# Patient Record
Sex: FEMALE | Race: WHITE | NOT HISPANIC OR LATINO | Employment: FULL TIME | URBAN - METROPOLITAN AREA
[De-identification: names, ages, dates, MRNs, and addresses within clinical notes are randomized per-mention and may not be internally consistent; named-entity substitution may affect disease eponyms.]

---

## 2017-01-09 PROBLEM — I10 BENIGN ESSENTIAL HYPERTENSION: Status: ACTIVE | Noted: 2017-01-09

## 2017-01-15 ENCOUNTER — OFFICE VISIT (OUTPATIENT)
Dept: URGENT CARE | Facility: URGENT CARE | Age: 37
End: 2017-01-15
Payer: COMMERCIAL

## 2017-01-15 VITALS
OXYGEN SATURATION: 98 % | HEART RATE: 98 BPM | SYSTOLIC BLOOD PRESSURE: 142 MMHG | DIASTOLIC BLOOD PRESSURE: 84 MMHG | TEMPERATURE: 96.4 F

## 2017-01-15 DIAGNOSIS — H66.002 ACUTE SUPPURATIVE OTITIS MEDIA OF LEFT EAR WITHOUT SPONTANEOUS RUPTURE OF TYMPANIC MEMBRANE, RECURRENCE NOT SPECIFIED: Primary | ICD-10-CM

## 2017-01-15 DIAGNOSIS — B37.31 YEAST INFECTION OF THE VAGINA: ICD-10-CM

## 2017-01-15 PROCEDURE — 99213 OFFICE O/P EST LOW 20 MIN: CPT | Performed by: NURSE PRACTITIONER

## 2017-01-15 RX ORDER — FLUCONAZOLE 150 MG/1
150 TABLET ORAL
Qty: 2 TABLET | Refills: 0 | Status: SHIPPED | OUTPATIENT
Start: 2017-01-15 | End: 2017-03-13

## 2017-01-15 NOTE — PROGRESS NOTES
SUBJECTIVE:  Yolanda Barnes is a 36 year old female who presents with left ear fullness for 2 day(s).   Severity: moderate   Additional symptoms include congestion, cough, ear pain and post-nasal drip.      History of recurrent otitis: Finished antibiotic last week for left ear infection     Past Medical History   Diagnosis Date     Major depressive disorder, recurrent episode, moderate (H)      major depressive affective disorder, recurrent episodesEvaristo, Dr. MER Damon, psychiatrist.      Dysplasia of cervix, unspecified age 17     [facility]     Human papillomavirus in conditions classified elsewhere and of unspecified site      age 18     Irritable bowel syndrome 1/05     Anxiety      Chickenpox      ASCUS favor benign 8/2014     Neg HPV - cotest in 3 yrs       Social History   Substance Use Topics     Smoking status: Never Smoker      Smokeless tobacco: Never Used     Alcohol Use: No      Comment: rare- quit with pregnancy       REVIEW OF SYSTEMS  ROS:   CONSTITUTIONAL:NEGATIVE for fever, chills, change in weight  INTEGUMENTARY/SKIN: NEGATIVE for worrisome rashes, moles or lesions  EYES: NEGATIVE for vision changes or irritation  ENT/MOUTH: See above   RESP:NEGATIVE for significant cough or SOB  CV: NEGATIVE for chest pain, palpitations or peripheral edema  MUSCULOSKELETAL: NEGATIVE for significant arthralgias or myalgia        OBJECTIVE:  /84 mmHg  Pulse 98  Temp(Src) 96.4  F (35.8  C) (Tympanic)  SpO2 98%   The right TM is normal: no effusions, no erythema, and normal landmarks     The right auditory canal is normal and without drainage, edema or erythema  The left TM is erythematous  The left auditory canal is normal and without drainage, edema or erythema  Oropharynx exam is normal: no lesions, erythema, adenopathy or exudate.  GENERAL: no acute distress  EYES: EOMI,  PERRL, conjunctiva clear  NECK: supple, non-tender to palpation, no adenopathy noted  RESP:  lungs clear to auscultation - no rales, rhonchi or wheezes  SKIN: no suspicious lesions or rashes   CV: regular rates and rhythm, normal    ASSESSMENT:    ICD-10-CM    1. Acute suppurative otitis media of left ear without spontaneous rupture of tympanic membrane, recurrence not specified H66.002 amoxicillin-clavulanate (AUGMENTIN) 875-125 MG per tablet   2. Yeast infection of the vagina B37.3 fluconazole (DIFLUCAN) 150 MG tablet         PLAN:  Has a history of getting yeast infection will prescribe fluconazole.    Patient Instructions   Use medication as directed.    May use acetaminophen, ibuprofen prn.  Follow up with PCP for recheck in 2 weeks, return sooner if symptoms worsen or change.    Discussed further evaluation by ENT if recurrent otitis media or treatment failure occurs.     Patient voiced understanding of instructions given.       Middle Ear Infection (Adult)  You have an infection of the middle ear (the space behind the eardrum). This is also called acute otitis media (AOM). Sometimes it is caused by the common cold. This is because congestion can block the internal passage (eustachian tube) that drains fluid from the middle ear. When the middle ear fills with fluid, bacteria can grow there and cause an infection. Oral antibiotics are used to treat this illness, not ear drops. Symptoms usually start to improve within 1 to 2 days of treatment.    Home care  The following are general care guidelines:    Finish all of the antibiotic medicine given, even though you may feel better after the first few days.    You may use acetaminophen or ibuprofen to control pain, unless something else was prescribed. [NOTE: If you have chronic liver or kidney disease or have ever had a stomach ulcer or GI bleeding, talk with your doctor before using these medicines.] Do not give aspirin to anyone under 18 years of age who has a fever. It may cause severe liver damage.  Follow-up care  Follow up with your doctor in 2 weeks  if all symptoms have not gotten better, or if hearing doesn't go back to normal within 1 month.  When to seek medical care  Get prompt medical attention if any of the following occur:    Ear pain gets worse or does not improve after 3 days of treatment    Unusual drowsiness or confusion    Neck pain, stiff neck, or headache    Fluid or blood draining from the ear canal    Fever of 100.4 F (38 C) or higher after 3 days of antibiotics, or as directed by your health care provider    Convulsion (seizure)    4090-9126 [employer]. [address] [place] [address]. All rights reserved. This information is not intended as a substitute for professional medical care. Always follow your healthcare professional's instructions.        Acute Sinusitis    Acute sinusitis is inflammation (irritation and swelling) of the sinuses. It is usually from a bacterial infection that follows an upper respiratory viral infection. Your doctor can help you find relief. Read on to learn more.  What is acute sinusitis?  Sinuses are air-filled spaces in the skull behind the face. They are kept moist and clean by a lining of mucosa. Things such as pollen, smoke, and chemical fumes can irritate the mucosa. It can then become inflamed (swell up). As a response to irritation, the mucosa makes more mucus and other fluids. Tiny hairlike cilia cover the mucosa. Cilia help transport mucus toward the opening of the sinus. Too much mucus may cause the cilia to stop working. This blocks the sinus opening. A buildup of fluid in the sinuses then leads to symptoms such as pain and pressure. It can also encourage growth of bacteria in the sinuses.  Common symptoms of acute sinusitis  You may have:    Facial soreness pain    Headache    Fever    Postnasal drip (drainage in the back of the throat)    Congestion    Drainage that is thick and colored, instead of clear    Cough  Diagnosis of acute sinusitis  The doctor will ask about your  symptoms and medical history. He or she will examine your ear, nose, and throat. X-rays are usually not needed. If your sinusitis recurs, you may have a culture to check for bacteria or imaging tests.     An evaluation will be done. A culture (sample of mucus) is sometimes taken to check for bacteria. If you have multiple bouts of sinusitis, imaging (X-rays or CAT scans) may be done to check for an anatomic cause of the infection.  Treatment of acute sinusitis  Treatment is designed to unblock the sinus opening and help the cilia work again. Antihistamine and decongestant medications may be prescribed. These can reduce inflammation and decrease fluid production. If a bacterial infection is present, it is treated with antibiotic medication for 10 to 14 days. This medication should be taken until it is gone, even if you feel better.    [id number] [employer]. [address] [place] [address]. All rights reserved. This information is not intended as a substitute for professional medical care. Always follow your healthcare professional's instructions.          TANNA Tanner CNP

## 2017-01-15 NOTE — PATIENT INSTRUCTIONS
Use medication as directed.    May use acetaminophen, ibuprofen prn.  Follow up with PCP for recheck in 2 weeks, return sooner if symptoms worsen or change.    Discussed further evaluation by ENT if recurrent otitis media or treatment failure occurs.     Patient voiced understanding of instructions given.       Middle Ear Infection (Adult)  You have an infection of the middle ear (the space behind the eardrum). This is also called acute otitis media (AOM). Sometimes it is caused by the common cold. This is because congestion can block the internal passage (eustachian tube) that drains fluid from the middle ear. When the middle ear fills with fluid, bacteria can grow there and cause an infection. Oral antibiotics are used to treat this illness, not ear drops. Symptoms usually start to improve within 1 to 2 days of treatment.    Home care  The following are general care guidelines:    Finish all of the antibiotic medicine given, even though you may feel better after the first few days.    You may use acetaminophen or ibuprofen to control pain, unless something else was prescribed. [NOTE: If you have chronic liver or kidney disease or have ever had a stomach ulcer or GI bleeding, talk with your doctor before using these medicines.] Do not give aspirin to anyone under 18 years of age who has a fever. It may cause severe liver damage.  Follow-up care  Follow up with your doctor in 2 weeks if all symptoms have not gotten better, or if hearing doesn't go back to normal within 1 month.  When to seek medical care  Get prompt medical attention if any of the following occur:    Ear pain gets worse or does not improve after 3 days of treatment    Unusual drowsiness or confusion    Neck pain, stiff neck, or headache    Fluid or blood draining from the ear canal    Fever of 100.4 F (38 C) or higher after 3 days of antibiotics, or as directed by your health care provider    Convulsion (seizure)    8168-3002 [employer] [address] [place] [address]. All rights reserved. This information is not intended as a substitute for professional medical care. Always follow your healthcare professional's instructions.        Acute Sinusitis    Acute sinusitis is inflammation (irritation and swelling) of the sinuses. It is usually from a bacterial infection that follows an upper respiratory viral infection. Your doctor can help you find relief. Read on to learn more.  What is acute sinusitis?  Sinuses are air-filled spaces in the skull behind the face. They are kept moist and clean by a lining of mucosa. Things such as pollen, smoke, and chemical fumes can irritate the mucosa. It can then become inflamed (swell up). As a response to irritation, the mucosa makes more mucus and other fluids. Tiny hairlike cilia cover the mucosa. Cilia help transport mucus toward the opening of the sinus. Too much mucus may cause the cilia to stop working. This blocks the sinus opening. A buildup of fluid in the sinuses then leads to symptoms such as pain and pressure. It can also encourage growth of bacteria in the sinuses.  Common symptoms of acute sinusitis  You may have:    Facial soreness pain    Headache    Fever    Postnasal drip (drainage in the back of the throat)    Congestion    Drainage that is thick and colored, instead of clear    Cough  Diagnosis of acute sinusitis  The doctor will ask about your symptoms and medical history. He or she will examine your ear, nose, and throat. X-rays are usually not needed. If your sinusitis recurs, you may have a culture to check for bacteria or imaging tests.     An evaluation will be done. A culture (sample of mucus) is sometimes taken to check for bacteria. If you have multiple bouts of sinusitis, imaging (X-rays or CAT scans) may be done to check for an anatomic cause of the infection.  Treatment of acute sinusitis  Treatment is designed to unblock the sinus opening and help the cilia work  again. Antihistamine and decongestant medications may be prescribed. These can reduce inflammation and decrease fluid production. If a bacterial infection is present, it is treated with antibiotic medication for 10 to 14 days. This medication should be taken until it is gone, even if you feel better.    4314-3773 [employer] [address] [place] [address], [place] [address]. All rights reserved. This information is not intended as a substitute for professional medical care. Always follow your healthcare professional's instructions.

## 2017-01-15 NOTE — MR AVS SNAPSHOT
After Visit Summary   1/15/2017    Yolanda Barnes    MRN: [id number]           Patient Information     Date Of Birth          1980        Visit Information        Provider Department      1/15/2017 9:05 AM Frances Mccarty APRN CNP [facility]        Today's Diagnoses     Acute suppurative otitis media of left ear without spontaneous rupture of tympanic membrane, recurrence not specified    -  1       Care Instructions    Use medication as directed.    May use acetaminophen, ibuprofen prn.  Follow up with PCP for recheck in 2 weeks, return sooner if symptoms worsen or change.    Discussed further evaluation by ENT if recurrent otitis media or treatment failure occurs.     Patient voiced understanding of instructions given.       Middle Ear Infection (Adult)  You have an infection of the middle ear (the space behind the eardrum). This is also called acute otitis media (AOM). Sometimes it is caused by the common cold. This is because congestion can block the internal passage (eustachian tube) that drains fluid from the middle ear. When the middle ear fills with fluid, bacteria can grow there and cause an infection. Oral antibiotics are used to treat this illness, not ear drops. Symptoms usually start to improve within 1 to 2 days of treatment.    Home care  The following are general care guidelines:    Finish all of the antibiotic medicine given, even though you may feel better after the first few days.    You may use acetaminophen or ibuprofen to control pain, unless something else was prescribed. [NOTE: If you have chronic liver or kidney disease or have ever had a stomach ulcer or GI bleeding, talk with your doctor before using these medicines.] Do not give aspirin to anyone under 18 years of age who has a fever. It may cause severe liver damage.  Follow-up care  Follow up with your doctor in 2 weeks if all symptoms have not gotten better, or if hearing  doesn't go back to normal within 1 month.  When to seek medical care  Get prompt medical attention if any of the following occur:    Ear pain gets worse or does not improve after 3 days of treatment    Unusual drowsiness or confusion    Neck pain, stiff neck, or headache    Fluid or blood draining from the ear canal    Fever of 100.4 F (38 C) or higher after 3 days of antibiotics, or as directed by your health care provider    Convulsion (seizure)    1126-0389 [employer]. [address] [place] [address]. All rights reserved. This information is not intended as a substitute for professional medical care. Always follow your healthcare professional's instructions.        Acute Sinusitis    Acute sinusitis is inflammation (irritation and swelling) of the sinuses. It is usually from a bacterial infection that follows an upper respiratory viral infection. Your doctor can help you find relief. Read on to learn more.  What is acute sinusitis?  Sinuses are air-filled spaces in the skull behind the face. They are kept moist and clean by a lining of mucosa. Things such as pollen, smoke, and chemical fumes can irritate the mucosa. It can then become inflamed (swell up). As a response to irritation, the mucosa makes more mucus and other fluids. Tiny hairlike cilia cover the mucosa. Cilia help transport mucus toward the opening of the sinus. Too much mucus may cause the cilia to stop working. This blocks the sinus opening. A buildup of fluid in the sinuses then leads to symptoms such as pain and pressure. It can also encourage growth of bacteria in the sinuses.  Common symptoms of acute sinusitis  You may have:    Facial soreness pain    Headache    Fever    Postnasal drip (drainage in the back of the throat)    Congestion    Drainage that is thick and colored, instead of clear    Cough  Diagnosis of acute sinusitis  The doctor will ask about your symptoms and medical history. He or she will examine your  ear, nose, and throat. X-rays are usually not needed. If your sinusitis recurs, you may have a culture to check for bacteria or imaging tests.     An evaluation will be done. A culture (sample of mucus) is sometimes taken to check for bacteria. If you have multiple bouts of sinusitis, imaging (X-rays or CAT scans) may be done to check for an anatomic cause of the infection.  Treatment of acute sinusitis  Treatment is designed to unblock the sinus opening and help the cilia work again. Antihistamine and decongestant medications may be prescribed. These can reduce inflammation and decrease fluid production. If a bacterial infection is present, it is treated with antibiotic medication for 10 to 14 days. This medication should be taken until it is gone, even if you feel better.    0626-6026 [employer]. [address] [place] [address]. All rights reserved. This information is not intended as a substitute for professional medical care. Always follow your healthcare professional's instructions.              Follow-ups after your visit        Your next 10 appointments already scheduled     Jan 27, 2017 10:00 AM   SHORT with Gasper Araya MD   [facility] ([address] [id number] [phone]              Who to contact     If you have questions or need follow up information about today's clinic visit or your schedule please contact [facility] directly at [phone].  Normal or non-critical lab and imaging results will be communicated to you by MyChart, letter or phone within 4 business days after the clinic has received the results. If you do not hear from us within 7 days, please contact the clinic through MyChart or phone. If you have a critical or abnormal lab result, we will notify you by phone as soon as possible.  Submit refill requests through [employer] or call your pharmacy and they will  forward the refill request to us. Please allow 3 business days for your refill to be completed.          Additional Information About Your Visit        [employer] Information     [employer] gives you secure access to your electronic health record. If you see a primary care provider, you can also send messages to your care team and make appointments. If you have questions, please call your primary care clinic.  If you do not have a primary care provider, please call [phone] and they will assist you.        Care EveryWhere ID     This is your Care EveryWhere ID. This could be used by other organizations to access your [facility] medical records  [id number]        Your Vitals Were     Pulse Temperature Pulse Oximetry             98 96.4  F (35.8  C) (Tympanic) 98%          Blood Pressure from Last 3 Encounters:   01/15/17 142/84   12/30/16 154/98   11/07/16 150/95    Weight from Last 3 Encounters:   12/30/16 172 lb (78.019 kg)   11/07/16 169 lb (76.658 kg)   02/19/16 170 lb (77.111 kg)              Today, you had the following     No orders found for display         Today's Medication Changes          These changes are accurate as of: 1/15/17  9:16 AM.  If you have any questions, ask your nurse or doctor.               Start taking these medicines.        Dose/Directions    amoxicillin-clavulanate 875-125 MG per tablet   Commonly known as:  AUGMENTIN   Used for:  Acute suppurative otitis media of left ear without spontaneous rupture of tympanic membrane, recurrence not specified   Started by:  Frances Mccarty APRN CNP        Dose:  1 tablet   Take 1 tablet by mouth 2 times daily   Quantity:  20 tablet   Refills:  0            Where to get your medicines      These medications were sent to [id number] - [place], [place] - [address] [facility] [address]    Hours:  Closed 10-16-08 business to [facility] Phone:  [phone]    - amoxicillin-clavulanate 875-125 MG per tablet              Primary Care Provider Office Phone # Fax #    Karon Odell [phone]       [facility] [address] [facility] [address]        Thank you!     Thank you for choosing [facility]  for your care. Our goal is always to provide you with excellent care. Hearing back from our patients is one way we can continue to improve our services. Please take a few minutes to complete the written survey that you may receive in the mail after your visit with us. Thank you!             Your Updated Medication List - Protect others around you: Learn how to safely use, store and throw away your medicines at www.disposemymeds.org.          This list is accurate as of: 1/15/17  9:16 AM.  Always use your most recent med list.                   Brand Name Dispense Instructions for use    amoxicillin 875 MG tablet    AMOXIL    20 tablet    Take 1 tablet (875 mg) by mouth 2 times daily       amoxicillin-clavulanate 875-125 MG per tablet    AUGMENTIN    20 tablet    Take 1 tablet by mouth 2 times daily       fish oil-omega-3 fatty acids 1000 MG capsule      Take 2 g by mouth daily       hydrochlorothiazide 25 MG tablet    HYDRODIURIL    90 tablet    Take 1 tablet (25 mg) by mouth daily       vitamin D 2000 UNITS Caps

## 2017-03-13 DIAGNOSIS — B37.31 YEAST INFECTION OF THE VAGINA: ICD-10-CM

## 2017-03-13 RX ORDER — FLUCONAZOLE 150 MG/1
150 TABLET ORAL
Qty: 2 TABLET | Refills: 0 | Status: SHIPPED
Start: 2017-03-13 | End: 2017-08-18

## 2017-03-13 NOTE — TELEPHONE ENCOUNTER
Pt called. States she is having symptoms of white discharge, itching and burning or vaginal area. Would like refill of Diflucan. Deloris Joy RN

## 2017-03-13 NOTE — TELEPHONE ENCOUNTER
fluconazole      Last Written Prescription Date: 02/19/2016  Last Fill Quantity:1 ,  # refills: 1   Last Office Visit with G, P or [facility] prescribing provider: 12/30/2016    JA JEAN BAPTISTE [facility]

## 2017-06-01 ENCOUNTER — TELEPHONE (OUTPATIENT)
Dept: FAMILY MEDICINE | Facility: CLINIC | Age: 37
End: 2017-06-01

## 2017-06-01 DIAGNOSIS — H10.32 ACUTE CONJUNCTIVITIS OF LEFT EYE: Primary | ICD-10-CM

## 2017-06-01 RX ORDER — POLYMYXIN B SULFATE AND TRIMETHOPRIM 1; 10000 MG/ML; [USP'U]/ML
1 SOLUTION OPHTHALMIC EVERY 4 HOURS
Qty: 1 BOTTLE | Refills: 0 | Status: SHIPPED | OUTPATIENT
Start: 2017-06-01 | End: 2017-06-08

## 2017-06-01 NOTE — TELEPHONE ENCOUNTER
RN Conjunctivitis Protocol: Ages 2 and older  Yolanda Barnes is a 36 year old female is having symptoms reviewed for possible conjunctivitis.      ASSESSMENT/PLAN:  Allergy to Sulfa?  No   1.  Medication Indicated: YES - POLYTRIM 64023-9.1 UNIT/ML-% OP Sol, 1 drop in affected eye(s) 4 times daily while awake x 7 days. .    2.  Education regarding contact precautions, hand washing, avoid wearing contacts until finished with drops or until symptoms resolve, contact clinic if there is no improvement of symptoms within 3 days and if develops eye pain or sensitivity to light.   3.  Follow-up: Schedule an appointment with a provider if symptoms do not improve after 3 days of antibiotics or if symptoms return after antibiotic therapy is complete.  4.  Patient verbalized understanding of this plan and is agreeable.    SUBJECTIVE:     Conjunctival symptoms: redness, mattering (yellow/green) and itching   Location: left eye  Onset: 2 days ago  In addition notes: None  Contact Lens use?: No  Complicating factors    Reports:NONE  Denies:Vision changes; not cleared with blinking, Recent  history of eye trauma, Sensitivity to light, Severe eye pain, Fever: none, Eyelid symptoms None      OBJECTIVE:     Encounter handled by: Nurse Triage.     Deloris Melvin RN

## 2017-06-01 NOTE — TELEPHONE ENCOUNTER
Reason for call:  Patient reporting a symptom    Symptom or request: L Eye Red, itchy, Mattery    Duration (how long have symptoms been present): yesterday    Additional comments: Pt daughter was just in the ER for this Sat. Mom now has it could she be treated for this. Please Advise.    Phone Number patient can be reached at:  Home number on file [phone] (home)    Best Time:  Any Time      Can we leave a detailed message on this number:  YES    Call taken on 6/1/2017 at 9:23 AM by Magdalena Vance

## 2017-08-18 ENCOUNTER — OFFICE VISIT (OUTPATIENT)
Dept: FAMILY MEDICINE | Facility: CLINIC | Age: 37
End: 2017-08-18
Payer: COMMERCIAL

## 2017-08-18 VITALS
RESPIRATION RATE: 16 BRPM | HEART RATE: 81 BPM | TEMPERATURE: 97.7 F | DIASTOLIC BLOOD PRESSURE: 78 MMHG | SYSTOLIC BLOOD PRESSURE: 112 MMHG | OXYGEN SATURATION: 99 % | HEIGHT: 62 IN | WEIGHT: 173 LBS | BODY MASS INDEX: 31.83 KG/M2

## 2017-08-18 DIAGNOSIS — N94.3 PMS (PREMENSTRUAL SYNDROME): ICD-10-CM

## 2017-08-18 DIAGNOSIS — N92.0 MENORRHAGIA WITH REGULAR CYCLE: ICD-10-CM

## 2017-08-18 DIAGNOSIS — I10 BENIGN ESSENTIAL HYPERTENSION: ICD-10-CM

## 2017-08-18 DIAGNOSIS — Z13.6 CARDIOVASCULAR SCREENING; LDL GOAL LESS THAN 160: ICD-10-CM

## 2017-08-18 DIAGNOSIS — Z00.00 ROUTINE GENERAL MEDICAL EXAMINATION AT A HEALTH CARE FACILITY: Primary | ICD-10-CM

## 2017-08-18 LAB
ALBUMIN SERPL-MCNC: 4 G/DL (ref 3.4–5)
ALP SERPL-CCNC: 58 U/L (ref 40–150)
ALT SERPL W P-5'-P-CCNC: 32 U/L (ref 0–50)
ANION GAP SERPL CALCULATED.3IONS-SCNC: 8 MMOL/L (ref 3–14)
AST SERPL W P-5'-P-CCNC: 18 U/L (ref 0–45)
BILIRUB SERPL-MCNC: 0.6 MG/DL (ref 0.2–1.3)
BUN SERPL-MCNC: 15 MG/DL (ref 7–30)
CALCIUM SERPL-MCNC: 9.2 MG/DL (ref 8.5–10.1)
CHLORIDE SERPL-SCNC: 102 MMOL/L (ref 94–109)
CHOLEST SERPL-MCNC: 233 MG/DL
CO2 SERPL-SCNC: 25 MMOL/L (ref 20–32)
CREAT SERPL-MCNC: 0.72 MG/DL (ref 0.52–1.04)
ERYTHROCYTE [DISTWIDTH] IN BLOOD BY AUTOMATED COUNT: 13.7 % (ref 10–15)
GFR SERPL CREATININE-BSD FRML MDRD: >90 ML/MIN/1.7M2
GLUCOSE SERPL-MCNC: 92 MG/DL (ref 70–99)
HCT VFR BLD AUTO: 39.1 % (ref 35–47)
HDLC SERPL-MCNC: 53 MG/DL
HGB BLD-MCNC: 12.9 G/DL (ref 11.7–15.7)
LDLC SERPL CALC-MCNC: 149 MG/DL
MCH RBC QN AUTO: 28.4 PG (ref 26.5–33)
MCHC RBC AUTO-ENTMCNC: 33 G/DL (ref 31.5–36.5)
MCV RBC AUTO: 86 FL (ref 78–100)
NONHDLC SERPL-MCNC: 180 MG/DL
PLATELET # BLD AUTO: 280 10E9/L (ref 150–450)
POTASSIUM SERPL-SCNC: 4 MMOL/L (ref 3.4–5.3)
PROT SERPL-MCNC: 7.3 G/DL (ref 6.8–8.8)
RBC # BLD AUTO: 4.55 10E12/L (ref 3.8–5.2)
SODIUM SERPL-SCNC: 135 MMOL/L (ref 133–144)
TRIGL SERPL-MCNC: 153 MG/DL
TSH SERPL DL<=0.005 MIU/L-ACNC: 1.17 MU/L (ref 0.4–4)
WBC # BLD AUTO: 6 10E9/L (ref 4–11)

## 2017-08-18 PROCEDURE — G0145 SCR C/V CYTO,THINLAYER,RESCR: HCPCS | Performed by: FAMILY MEDICINE

## 2017-08-18 PROCEDURE — 36415 COLL VENOUS BLD VENIPUNCTURE: CPT | Performed by: FAMILY MEDICINE

## 2017-08-18 PROCEDURE — 99395 PREV VISIT EST AGE 18-39: CPT | Performed by: FAMILY MEDICINE

## 2017-08-18 PROCEDURE — 87624 HPV HI-RISK TYP POOLED RSLT: CPT | Performed by: FAMILY MEDICINE

## 2017-08-18 PROCEDURE — 99213 OFFICE O/P EST LOW 20 MIN: CPT | Mod: 25 | Performed by: FAMILY MEDICINE

## 2017-08-18 PROCEDURE — 93000 ELECTROCARDIOGRAM COMPLETE: CPT | Performed by: FAMILY MEDICINE

## 2017-08-18 PROCEDURE — 84443 ASSAY THYROID STIM HORMONE: CPT | Performed by: FAMILY MEDICINE

## 2017-08-18 PROCEDURE — 85027 COMPLETE CBC AUTOMATED: CPT | Performed by: FAMILY MEDICINE

## 2017-08-18 PROCEDURE — 80061 LIPID PANEL: CPT | Performed by: FAMILY MEDICINE

## 2017-08-18 PROCEDURE — 80053 COMPREHEN METABOLIC PANEL: CPT | Performed by: FAMILY MEDICINE

## 2017-08-18 RX ORDER — HYDROCHLOROTHIAZIDE 25 MG/1
25 TABLET ORAL DAILY
Qty: 90 TABLET | Refills: 3 | Status: SHIPPED | OUTPATIENT
Start: 2017-08-18 | End: 2018-04-30

## 2017-08-18 RX ORDER — LEVONORGESTREL AND ETHINYL ESTRADIOL 0.15-0.03
1 KIT ORAL DAILY
Qty: 91 TABLET | Refills: 3 | Status: SHIPPED | OUTPATIENT
Start: 2017-08-18 | End: 2018-08-02

## 2017-08-18 NOTE — MR AVS SNAPSHOT
After Visit Summary   8/18/2017    Yolanda Barnes    MRN: [id number]           Patient Information     Date Of Birth          1980        Visit Information        Provider Department      8/18/2017 8:00 AM Karon Odell MD [facility]        Today's Diagnoses     Routine general medical examination at a health care facility    -  1    Menorrhagia with regular cycle        CARDIOVASCULAR SCREENING; LDL GOAL LESS THAN 160        PMS (premenstrual syndrome)        Benign essential hypertension          Care Instructions    General recommendations for sleep problems (Insomnia)  Allow 2-4 weeks to see results   Establish a regular sleep schedule   Go to bed at same time each night   Get up at same time each day   Avoid sleeping-in on Sunday morning   Cut down time in bed (if not asleep, get up)   Avoid trying to force yourself to sleep   Use your bed only for sleep and sex   Do not read or watch Television in bed   Make the bedroom comfortable   Keep temperature in your bedroom comfortable   Keep bedroom quiet when sleeping   Consider ear plugs (silicon)   Keep bedroom dark enough   Use dark blinds or wear an eye mask if needed   Relax at bedtime   Relax each muscle group individually   Begin with your feet   Work toward your head   Deal with your worries before bedtime   Set aside a worry time for 30 minutes earlier   Listen to relaxation tapes   Classical Music or Nature sounds   Imagine a tranquil scene (e.g. waterfall or beach)   Back Massage   Gentle 5-minute back rub prior to bedtime   Perform measures to make you tired at bedtime   Get regular Exercise each day (6 hours before bedtime)   Take medications only as directed   Eat a light bedtime snack or warm drink   Warm milk   Warm herbal tea (non-caffeinated)   Special Therapy Measures   Sleep Restriction Therapy   Limit time in bed for 15 minutes more than sleep   Do not set limit under 4 hours   Increase time by 15  minutes per effective sleep trial   Effective sleep suggests >90% of bedtime asleep   Stimulus Control   Do not lie awake for more than 30 minutes   Get out of bed and perform a quiet activity   Return to bed when sleepy   Repeat as many times per night as needed   No Napping during day   Things to avoid   Do not Exercise just before bedtime   No overstimulating activities just before bed   No competitive games before bedtime   No exciting Television programs before bedtime   Avoid caffeine after lunchtime   Avoid chocolate   Avoid coffee, tea, or soda   Do not use alcohol to induce sleep (worsens Insomnia)   Do not take someone else's sleeping pills   Do not look at the clock when awakening   Do not turn on light when getting up to use bathroom   Read the book Say Good Night To Insomnia  Preventive Health Recommendations  Female Ages 26 - 39  Yearly exam:   See your health care provider every year in order to    Review health changes.     Discuss preventive care.      Review your medicines if you your doctor has prescribed any.    Until age 30: Get a Pap test every three years (more often if you have had an abnormal result).    After age 30: Talk to your doctor about whether you should have a Pap test every 3 years or have a Pap test with HPV screening every 5 years.   You do not need a Pap test if your uterus was removed (hysterectomy) and you have not had cancer.  You should be tested each year for STDs (sexually transmitted diseases), if you're at risk.   Talk to your provider about how often to have your cholesterol checked.  If you are at risk for diabetes, you should have a diabetes test (fasting glucose).  Shots: Get a flu shot each year. Get a tetanus shot every 10 years.   Nutrition:     Eat at least 5 servings of fruits and vegetables each day.    Eat whole-grain bread, whole-wheat pasta and brown rice instead of white grains and rice.    Talk to your provider about Calcium and Vitamin D.  "    Lifestyle    Exercise at least 150 minutes a week (30 minutes a day, 5 days of the week). This will help you control your weight and prevent disease.    Limit alcohol to one drink per day.    No smoking.     Wear sunscreen to prevent skin cancer.    See your dentist every six months for an exam and cleaning.            Follow-ups after your visit        Who to contact     If you have questions or need follow up information about today's clinic visit or your schedule please contact [facility] directly at [phone].  Normal or non-critical lab and imaging results will be communicated to you by [employer], letter or phone within 4 business days after the clinic has received the results. If you do not hear from us within 7 days, please contact the clinic through [employer] or phone. If you have a critical or abnormal lab result, we will notify you by phone as soon as possible.  Submit refill requests through [employer] or call your pharmacy and they will forward the refill request to us. Please allow 3 business days for your refill to be completed.          Additional Information About Your Visit        [employer] Information     [employer] gives you secure access to your electronic health record. If you see a primary care provider, you can also send messages to your care team and make appointments. If you have questions, please call your primary care clinic.  If you do not have a primary care provider, please call [phone] and they will assist you.        Care EveryWhere ID     This is your Care EveryWhere ID. This could be used by other organizations to access your [place] medical records  [id number]        Your Vitals Were     Pulse Temperature Respirations Height Pulse Oximetry BMI (Body Mass Index)    81 97.7  F (36.5  C) (Tympanic) 16 5' 1.5\" (1.562 m) 99% 32.16 kg/m2       Blood Pressure from Last 3 Encounters:   08/18/17 112/78   01/15/17 142/84   12/30/16 (!) 154/98    Weight from Last 3 " Encounters:   08/18/17 173 lb (78.5 kg)   12/30/16 172 lb (78 kg)   11/07/16 169 lb (76.7 kg)              We Performed the Following     CBC with platelets     Comprehensive metabolic panel     EKG 12-lead complete w/read - Clinics     HPV High Risk Types DNA Cervical     Lipid panel reflex to direct LDL     Pap imaged thin layer screen with HPV - recommended age 30 - 65 years (select HPV order below)     TSH with free T4 reflex          Today's Medication Changes          These changes are accurate as of: 8/18/17  9:13 AM.  If you have any questions, ask your nurse or doctor.               Start taking these medicines.        Dose/Directions    levonorgestrel-ethinyl estradiol 0.15-0.03 MG per tablet   Commonly known as:  SEASONALE   Used for:  Menorrhagia with regular cycle   Started by:  Karon Odell MD        Dose:  1 tablet   Take 1 tablet by mouth daily   Quantity:  91 tablet   Refills:  3         These medicines have changed or have updated prescriptions.        Dose/Directions    hydrochlorothiazide 25 MG tablet   Commonly known as:  HYDRODIURIL   This may have changed:  additional instructions   Used for:  Benign essential hypertension   Changed by:  Karon Odell MD        Dose:  25 mg   Take 1 tablet (25 mg) by mouth daily Fill when needed   Quantity:  90 tablet   Refills:  3         Stop taking these medicines if you haven't already. Please contact your care team if you have questions.     amoxicillin 875 MG tablet   Commonly known as:  AMOXIL   Stopped by:  Karon Odell MD           amoxicillin-clavulanate 875-125 MG per tablet   Commonly known as:  AUGMENTIN   Stopped by:  Karon Odell MD           fluconazole 150 MG tablet   Commonly known as:  DIFLUCAN   Stopped by:  Karon Odell MD                Where to get your medicines      These medications were sent to [facility], [place] - [address] [place] [address] [address] [facility] [address]      Phone:  [phone]     hydrochlorothiazide 25 MG tablet    levonorgestrel-ethinyl estradiol 0.15-0.03 MG per tablet                Primary Care Provider Office Phone # Fax #    Karon Odell [phone] [address] [facility] [address]        Equal Access to Services     EVELINJENNIFFER ARTHUR : Hadii aad ku hadasho Soomaali, waaxda luqadaha, qaybta kaalmada adeegyada, waxmeka jeanniein haylexin adeleydi potter laMikeevgeny . So [facility] [phone].    ATENCIÓN: Si habla español, tiene a paz disposición servicios gratuitos de asistencia lingüística. Lamin al [phone].    We comply with applicable federal civil rights laws and [place] laws. We do not discriminate on the basis of race, color, national origin, age, disability sex, sexual orientation or gender identity.            Thank you!     Thank you for choosing [facility]  for your care. Our goal is always to provide you with excellent care. Hearing back from our patients is one way we can continue to improve our services. Please take a few minutes to complete the written survey that you may receive in the mail after your visit with us. Thank you!             Your Updated Medication List - Protect others around you: Learn how to safely use, store and throw away your medicines at www.disposemymeds.org.          This list is accurate as of: 8/18/17  9:13 AM.  Always use your most recent med list.                   Brand Name Dispense Instructions for use Diagnosis    fish oil-omega-3 fatty acids 1000 MG capsule      Take 2 g by mouth daily        hydrochlorothiazide 25 MG tablet    HYDRODIURIL    90 tablet    Take 1 tablet (25 mg) by mouth daily Fill when needed    Benign essential hypertension       levonorgestrel-ethinyl estradiol 0.15-0.03 MG per tablet    SEASONALE    91 tablet    Take 1 tablet by mouth daily    Menorrhagia with regular cycle       vitamin D 2000 UNITS Caps

## 2017-08-18 NOTE — PATIENT INSTRUCTIONS
General recommendations for sleep problems (Insomnia)  Allow 2-4 weeks to see results   Establish a regular sleep schedule   Go to bed at same time each night   Get up at same time each day   Avoid sleeping-in on Sunday morning   Cut down time in bed (if not asleep, get up)   Avoid trying to force yourself to sleep   Use your bed only for sleep and sex   Do not read or watch Television in bed   Make the bedroom comfortable   Keep temperature in your bedroom comfortable   Keep bedroom quiet when sleeping   Consider ear plugs (silicon)   Keep bedroom dark enough   Use dark blinds or wear an eye mask if needed   Relax at bedtime   Relax each muscle group individually   Begin with your feet   Work toward your head   Deal with your worries before bedtime   Set aside a worry time for 30 minutes earlier   Listen to relaxation tapes   Classical Music or Nature sounds   Imagine a tranquil scene (e.g. waterfall or beach)   Back Massage   Gentle 5-minute back rub prior to bedtime   Perform measures to make you tired at bedtime   Get regular Exercise each day (6 hours before bedtime)   Take medications only as directed   Eat a light bedtime snack or warm drink   Warm milk   Warm herbal tea (non-caffeinated)   Special Therapy Measures   Sleep Restriction Therapy   Limit time in bed for 15 minutes more than sleep   Do not set limit under 4 hours   Increase time by 15 minutes per effective sleep trial   Effective sleep suggests >90% of bedtime asleep   Stimulus Control   Do not lie awake for more than 30 minutes   Get out of bed and perform a quiet activity   Return to bed when sleepy   Repeat as many times per night as needed   No Napping during day   Things to avoid   Do not Exercise just before bedtime   No overstimulating activities just before bed   No competitive games before bedtime   No exciting Television programs before bedtime   Avoid caffeine after lunchtime   Avoid chocolate   Avoid coffee, tea, or soda   Do not use  alcohol to induce sleep (worsens Insomnia)   Do not take someone else's sleeping pills   Do not look at the clock when awakening   Do not turn on light when getting up to use bathroom   Read the book Say Good Night To Insomnia  Preventive Health Recommendations  Female Ages 26 - 39  Yearly exam:   See your health care provider every year in order to    Review health changes.     Discuss preventive care.      Review your medicines if you your doctor has prescribed any.    Until age 30: Get a Pap test every three years (more often if you have had an abnormal result).    After age 30: Talk to your doctor about whether you should have a Pap test every 3 years or have a Pap test with HPV screening every 5 years.   You do not need a Pap test if your uterus was removed (hysterectomy) and you have not had cancer.  You should be tested each year for STDs (sexually transmitted diseases), if you're at risk.   Talk to your provider about how often to have your cholesterol checked.  If you are at risk for diabetes, you should have a diabetes test (fasting glucose).  Shots: Get a flu shot each year. Get a tetanus shot every 10 years.   Nutrition:     Eat at least 5 servings of fruits and vegetables each day.    Eat whole-grain bread, whole-wheat pasta and brown rice instead of white grains and rice.    Talk to your provider about Calcium and Vitamin D.     Lifestyle    Exercise at least 150 minutes a week (30 minutes a day, 5 days of the week). This will help you control your weight and prevent disease.    Limit alcohol to one drink per day.    No smoking.     Wear sunscreen to prevent skin cancer.    See your dentist every six months for an exam and cleaning.

## 2017-08-18 NOTE — NURSING NOTE
"Chief Complaint   Patient presents with     Physical       Initial /78 (BP Location: Left arm)  Pulse 81  Temp 97.7  F (36.5  C) (Tympanic)  Resp 16  Ht 5' 1.5\" (1.562 m)  Wt 173 lb (78.5 kg)  SpO2 99%  BMI 32.16 kg/m2 Estimated body mass index is 32.16 kg/(m^2) as calculated from the following:    Height as of this encounter: 5' 1.5\" (1.562 m).    Weight as of this encounter: 173 lb (78.5 kg).  Medication Reconciliation: complete    Health Maintenance that is potentially due pending provider review:  NONE    n/a    Is there anyone who you would like to be able to receive your results? No  If yes have patient fill out WADE    "

## 2017-08-18 NOTE — PROGRESS NOTES
"   SUBJECTIVE:   CC: Yolanda Barnes is an 36 year old woman who presents for preventive health visit.     Physical   Annual:     Getting at least 3 servings of Calcium per day::  Yes    Bi-annual eye exam::  NO    Dental care twice a year::  Yes    Sleep apnea or symptoms of sleep apnea::  Excessive snoring    Diet::  Regular (no restrictions)    Frequency of exercise::  None    Taking medications regularly::  No    Barriers to taking medications::  Problems remembering to take them    Medication side effects::  None    Additional concerns today::  YES      PMS symptoms-once a month gets very irritable, \"impossible to please\", affecting her relationships at work and home, is seeing a counselor. Doesn't feel depressed, although has a long history of it, and tried several medications but hasn't been on anything for a long time.   Has very heavy periods, heavy for 5 days, changing pads every 2-3 hours..    Dad has type 2 diabetes, wants to be checked. Has had more vaginal yeast infections and read that could be a symptoms     Insomnia-she is taking benadryl to sleep occasionally and wonders if that is ok    Hypertension-was put on hydrochlorothiazide by Dr Araya. Tolerating medications well without significant side effects I note that she hasn't had a baseline EKG.    BP Readings from Last 6 Encounters:   08/18/17 112/78   01/15/17 142/84   12/30/16 (!) 154/98   11/07/16 (!) 150/95   02/19/16 120/78   04/04/15 (!) 157/103        Today's PHQ-2 Score:   PHQ-2 ( 1999 [employer]) 8/15/2017   Q1: Little interest or pleasure in doing things 0   Q2: Feeling down, depressed or hopeless 0   PHQ-2 Score 0   Q1: Little interest or pleasure in doing things Not at all   Q2: Feeling down, depressed or hopeless Not at all   PHQ-2 Score 0       Abuse: Current or Past(Physical, Sexual or Emotional)- No  Do you feel safe in your environment - Yes    Social History   Substance Use Topics     Smoking status: Never Smoker     Smokeless " tobacco: Never Used     Alcohol use No      Comment: rare- quit with pregnancy     The patient does not drink >3 drinks per day nor >7 drinks per week.    Reviewed orders with patient.  Reviewed health maintenance and updated orders accordingly - Yes  BP Readings from Last 3 Encounters:   17 112/78   01/15/17 142/84   16 (!) 154/98    Wt Readings from Last 3 Encounters:   17 173 lb (78.5 kg)   16 172 lb (78 kg)   16 169 lb (76.7 kg)                      Mammogram not appropriate for this patient based on age.    Pertinent mammograms are reviewed under the imaging tab.  History of abnormal Pap smear: NO - age 30-65 PAP every 5 years with negative HPV co-testing recommended    Reviewed and updated as needed this visit by clinical staff  Tobacco  Allergies  Meds  Problems  Med Hx  Surg Hx  Fam Hx  Soc Hx          Reviewed and updated as needed this visit by Provider  Allergies  Meds  Problems        Past Medical History:   Diagnosis Date     Anxiety      ASCUS favor benign 2014    Neg HPV - cotest in 3 yrs     Chickenpox      Dysplasia of cervix, unspecified age 17    [facility]     Human papillomavirus in conditions classified elsewhere and of unspecified site     age 18     Irritable bowel syndrome      Major depressive disorder, recurrent episode, moderate (H)     major depressive affective disorder, recurrent episodesDr. MER Loera, psychiatrist.       Past Surgical History:   Procedure Laterality Date      SECTION  2013    Procedure:  SECTION;   section;  Surgeon: Nimco Powell MD;  Location: [facility] OR      SECTION, TUBAL LIGATION, COMBINED N/A 2015    Procedure: COMBINED  SECTION, TUBAL LIGATION;  Surgeon: Karon Beckett MD;  Location: [facility] OR     MOUTH SURGERY      wisdom teeth     Obstetric History       T2      L2     SAB0   TAB0   Ectopic0   Multiple0  "  Live Births2       # Outcome Date GA Lbr Jesus/2nd Weight Sex Delivery Anes PTL Lv   2 Term 02/06/15 37w6d  8 lb 1 oz (3.657 kg) F CS-LTranv Spinal  SAM      Apgar1:  7                Apgar5: 8   1 Term 08/21/13 39w0d  7 lb 4 oz (3.289 kg) M CS-LTranv EPI N SAM      Name: CHIDI MYRICK      Apgar1:  3                Apgar5: 7          ROS:  C: NEGATIVE for fever, chills, change in weight  I: NEGATIVE for worrisome rashes, moles or lesions  E: NEGATIVE for vision changes or irritation  ENT: NEGATIVE for ear, mouth and throat problems  R: NEGATIVE for significant cough or SOB  B: NEGATIVE for masses, tenderness or discharge  CV: NEGATIVE for chest pain, palpitations or peripheral edema  GI: NEGATIVE for nausea, abdominal pain, heartburn, or change in bowel habits  : NEGATIVE for unusual urinary or vaginal symptoms. Periods are regular.  M: NEGATIVE for significant arthralgias or myalgia  N: NEGATIVE for weakness, dizziness or paresthesias  P: NEGATIVE for changes in mood or affect     OBJECTIVE:   /78 (BP Location: Left arm)  Pulse 81  Temp 97.7  F (36.5  C) (Tympanic)  Resp 16  Ht 5' 1.5\" (1.562 m)  Wt 173 lb (78.5 kg)  SpO2 99%  BMI 32.16 kg/m2  EXAM:  GENERAL: healthy, alert and no distress  EYES: Eyes grossly normal to inspection, PERRL and conjunctivae and sclerae normal  HENT: ear canals and TM's normal, nose and mouth without ulcers or lesions  NECK: no adenopathy, no asymmetry, masses, or scars and thyroid normal to palpation  RESP: lungs clear to auscultation - no rales, rhonchi or wheezes  BREAST: normal without masses, tenderness or nipple discharge and no palpable axillary masses or adenopathy  CV: regular rate and rhythm, normal S1 S2, no S3 or S4, no murmur, click or rub, no peripheral edema and peripheral pulses strong  ABDOMEN: soft, nontender, no hepatosplenomegaly, no masses and bowel sounds normal   (female): normal female external genitalia, normal urethral meatus, vaginal " "mucosa pink, moist, well rugated, and normal cervix/adnexa/uterus without masses or discharge  MS: no gross musculoskeletal defects noted, no edema  SKIN: no suspicious lesions or rashes  NEURO: Normal strength and tone, mentation intact and speech normal  PSYCH: mentation appears normal, affect normal/bright    EKG was normal    ASSESSMENT/PLAN:   Yolanda was seen today for physical.    Diagnoses and all orders for this visit:    Routine general medical examination at a health care facility  -     Pap imaged thin layer screen with HPV - recommended age 30 - 65 years (select HPV order below)  -     HPV High Risk Types DNA Cervical    Menorrhagia with regular cycle  -     levonorgestrel-ethinyl estradiol (SEASONALE) 0.15-0.03 MG per tablet; Take 1 tablet by mouth daily  -     TSH with free T4 reflex  -     CBC with platelets    CARDIOVASCULAR SCREENING; LDL GOAL LESS THAN 160  -     Lipid panel reflex to direct LDL    PMS (premenstrual syndrome)  -     TSH with free T4 reflex    Benign essential hypertension  -     Comprehensive metabolic panel  -     CBC with platelets  -     hydrochlorothiazide (HYDRODIURIL) 25 MG tablet; Take 1 tablet (25 mg) by mouth daily Fill when needed  -     EKG 12-lead complete w/read - Clinics      Will try the Pill for her periods and see if it helps her mood  Consider adding an SSRI    COUNSELING:  Reviewed preventive health counseling, as reflected in patient instructions              Healthy diet/nutrition       Osteoporosis Prevention/Bone Health    We discussed that we could use the Pill to regulate periods and possibly help with the mood. If not, could use ssri.   For now she would like to try this  Consider a varying progesterone if problems or adding an SSRI     reports that she has never smoked. She has never used smokeless tobacco.    Estimated body mass index is 32.16 kg/(m^2) as calculated from the following:    Height as of this encounter: 5' 1.5\" (1.562 m).    Weight as of " this encounter: 173 lb (78.5 kg).   Weight management plan: Discussed healthy diet and exercise guidelines and patient will follow up in 12 months in clinic to re-evaluate.    Counseling Resources:  ATP IV Guidelines  Pooled Cohorts Equation Calculator  Breast Cancer Risk Calculator  FRAX Risk Assessment  ICSI Preventive Guidelines  Dietary Guidelines for Americans, 2010  USDA's MyPlate  ASA Prophylaxis  Lung CA Screening    Karon Odell MD  [facility]  Answers for HPI/ROS submitted by the patient on 8/15/2017   PHQ-2 Score: 0

## 2017-08-22 LAB
COPATH REPORT: NORMAL
PAP: NORMAL

## 2017-08-24 LAB
FINAL DIAGNOSIS: NORMAL
HPV HR 12 DNA CVX QL NAA+PROBE: NEGATIVE
HPV16 DNA SPEC QL NAA+PROBE: NEGATIVE
HPV18 DNA SPEC QL NAA+PROBE: NEGATIVE
SPECIMEN DESCRIPTION: NORMAL

## 2017-09-28 ENCOUNTER — MYC MEDICAL ADVICE (OUTPATIENT)
Dept: FAMILY MEDICINE | Facility: CLINIC | Age: 37
End: 2017-09-28

## 2017-10-27 ENCOUNTER — ALLIED HEALTH/NURSE VISIT (OUTPATIENT)
Dept: FAMILY MEDICINE | Facility: CLINIC | Age: 37
End: 2017-10-27
Payer: COMMERCIAL

## 2017-10-27 DIAGNOSIS — Z23 NEED FOR PROPHYLACTIC VACCINATION AND INOCULATION AGAINST INFLUENZA: Primary | ICD-10-CM

## 2017-10-27 PROCEDURE — 99207 ZZC NO CHARGE NURSE ONLY: CPT

## 2017-10-27 PROCEDURE — 90471 IMMUNIZATION ADMIN: CPT

## 2017-10-27 PROCEDURE — 90686 IIV4 VACC NO PRSV 0.5 ML IM: CPT

## 2017-10-27 NOTE — MR AVS SNAPSHOT
After Visit Summary   10/27/2017    Yolanda Barnes    MRN: [id number]           Patient Information     Date Of Birth          1980        Visit Information        Provider Department      10/27/2017 11:30 AM [place] [facility]ER/[facility]N [facility]        Today's Diagnoses     Need for prophylactic vaccination and inoculation against influenza    -  1       Follow-ups after your visit        Who to contact     If you have questions or need follow up information about today's clinic visit or your schedule please contact [facility] directly at [phone].  Normal or non-critical lab and imaging results will be communicated to you by [employer], letter or phone within 4 business days after the clinic has received the results. If you do not hear from us within 7 days, please contact the clinic through [employer] or phone. If you have a critical or abnormal lab result, we will notify you by phone as soon as possible.  Submit refill requests through [employer] or call your pharmacy and they will forward the refill request to us. Please allow 3 business days for your refill to be completed.          Additional Information About Your Visit        MyChart Information     [employer] gives you secure access to your electronic health record. If you see a primary care provider, you can also send messages to your care team and make appointments. If you have questions, please call your primary care clinic.  If you do not have a primary care provider, please call [phone] and they will assist you.        Care EveryWhere ID     This is your Care EveryWhere ID. This could be used by other organizations to access your [place] medical records  [id number]         Blood Pressure from Last 3 Encounters:   08/18/17 112/78   01/15/17 142/84   12/30/16 (!) 154/98    Weight from Last 3 Encounters:   08/18/17 173 lb (78.5 kg)   12/30/16 172 lb (78 kg)   11/07/16 169 lb (76.7 kg)              We Performed  the Following     FLU VAC, SPLIT VIRUS IM > 3 YO (QUADRIVALENT) [50902]     Vaccine Administration, Initial [36479]        Primary Care Provider Office Phone # Fax #    Karon Odell [phone] [address] [place] [address]        Equal Access to Services     JENNIFFER ARTHUR : Hadii aad ku hadasho Soomaali, waaxda luqadaha, qaybta kaalmada adeegyada, waxay jeanniein haylexin adeleydi abbey layashira marie. So [facility] [phone].    ATENCIÓN: Si habla español, tiene a paz disposición servicios gratuitos de asistencia lingüística. [facility] [phone].    We comply with applicable federal civil rights laws and [place] laws. We do not discriminate on the basis of race, color, national origin, age, disability, sex, sexual orientation, or gender identity.            Thank you!     Thank you for choosing [facility]  for your care. Our goal is always to provide you with excellent care. Hearing back from our patients is one way we can continue to improve our services. Please take a few minutes to complete the written survey that you may receive in the mail after your visit with us. Thank you!             Your Updated Medication List - Protect others around you: Learn how to safely use, store and throw away your medicines at www.disposemymeds.org.          This list is accurate as of: 10/27/17 11:34 AM.  Always use your most recent med list.                   Brand Name Dispense Instructions for use Diagnosis    fish oil-omega-3 fatty acids 1000 MG capsule      Take 2 g by mouth daily        hydrochlorothiazide 25 MG tablet    HYDRODIURIL    90 tablet    Take 1 tablet (25 mg) by mouth daily Fill when needed    Benign essential hypertension       levonorgestrel-ethinyl estradiol 0.15-0.03 MG per tablet    SEASONALE    91 tablet    Take 1 tablet by mouth daily    Menorrhagia with regular cycle       vitamin D 2000 UNITS Caps

## 2018-04-30 ENCOUNTER — MYC REFILL (OUTPATIENT)
Dept: FAMILY MEDICINE | Facility: CLINIC | Age: 38
End: 2018-04-30

## 2018-04-30 DIAGNOSIS — I10 BENIGN ESSENTIAL HYPERTENSION: ICD-10-CM

## 2018-04-30 RX ORDER — HYDROCHLOROTHIAZIDE 25 MG/1
25 TABLET ORAL DAILY
Qty: 90 TABLET | Refills: 0 | Status: SHIPPED | OUTPATIENT
Start: 2018-04-30 | End: 2018-08-02

## 2018-04-30 NOTE — TELEPHONE ENCOUNTER
Message from [employer]:  Original authorizing provider: MD Yolanda Carroll would like a refill of the following medications:  hydrochlorothiazide (HYDRODIURIL) 25 MG tablet [Karon Odell MD]    Preferred pharmacy: [address]    Comment:  Can a refill please be sent in to the [facility] in [place] for this prescription? I was using the [facility] pharmacy for the first time I had the prescription but would prefer to use the [place] pharmacy. Please call or message with any questions. [phone]. Thank you, Yolanda Barnes

## 2018-08-02 ENCOUNTER — OFFICE VISIT (OUTPATIENT)
Dept: FAMILY MEDICINE | Facility: CLINIC | Age: 38
End: 2018-08-02
Payer: COMMERCIAL

## 2018-08-02 VITALS
HEIGHT: 62 IN | DIASTOLIC BLOOD PRESSURE: 82 MMHG | RESPIRATION RATE: 16 BRPM | TEMPERATURE: 99.2 F | SYSTOLIC BLOOD PRESSURE: 122 MMHG | OXYGEN SATURATION: 98 % | HEART RATE: 94 BPM | WEIGHT: 164 LBS | BODY MASS INDEX: 30.18 KG/M2

## 2018-08-02 DIAGNOSIS — F41.1 GAD (GENERALIZED ANXIETY DISORDER): Primary | ICD-10-CM

## 2018-08-02 DIAGNOSIS — F33.40 RECURRENT MAJOR DEPRESSIVE DISORDER, IN REMISSION (H): ICD-10-CM

## 2018-08-02 DIAGNOSIS — I10 BENIGN ESSENTIAL HYPERTENSION: ICD-10-CM

## 2018-08-02 DIAGNOSIS — N92.0 MENORRHAGIA WITH REGULAR CYCLE: ICD-10-CM

## 2018-08-02 DIAGNOSIS — Z13.6 CARDIOVASCULAR SCREENING; LDL GOAL LESS THAN 160: ICD-10-CM

## 2018-08-02 LAB
ALBUMIN SERPL-MCNC: 3.8 G/DL (ref 3.4–5)
ALP SERPL-CCNC: 37 U/L (ref 40–150)
ALT SERPL W P-5'-P-CCNC: 20 U/L (ref 0–50)
ANION GAP SERPL CALCULATED.3IONS-SCNC: 5 MMOL/L (ref 3–14)
AST SERPL W P-5'-P-CCNC: 13 U/L (ref 0–45)
BILIRUB SERPL-MCNC: 0.3 MG/DL (ref 0.2–1.3)
BUN SERPL-MCNC: 15 MG/DL (ref 7–30)
CALCIUM SERPL-MCNC: 8.7 MG/DL (ref 8.5–10.1)
CHLORIDE SERPL-SCNC: 105 MMOL/L (ref 94–109)
CHOLEST SERPL-MCNC: 230 MG/DL
CO2 SERPL-SCNC: 28 MMOL/L (ref 20–32)
CREAT SERPL-MCNC: 0.72 MG/DL (ref 0.52–1.04)
ERYTHROCYTE [DISTWIDTH] IN BLOOD BY AUTOMATED COUNT: 12.5 % (ref 10–15)
GFR SERPL CREATININE-BSD FRML MDRD: >90 ML/MIN/1.7M2
GLUCOSE SERPL-MCNC: 89 MG/DL (ref 70–99)
HCT VFR BLD AUTO: 43.5 % (ref 35–47)
HDLC SERPL-MCNC: 42 MG/DL
HGB BLD-MCNC: 14.7 G/DL (ref 11.7–15.7)
LDLC SERPL CALC-MCNC: 157 MG/DL
MCH RBC QN AUTO: 30.1 PG (ref 26.5–33)
MCHC RBC AUTO-ENTMCNC: 33.8 G/DL (ref 31.5–36.5)
MCV RBC AUTO: 89 FL (ref 78–100)
NONHDLC SERPL-MCNC: 188 MG/DL
PLATELET # BLD AUTO: 255 10E9/L (ref 150–450)
POTASSIUM SERPL-SCNC: 4.3 MMOL/L (ref 3.4–5.3)
PROT SERPL-MCNC: 7.3 G/DL (ref 6.8–8.8)
RBC # BLD AUTO: 4.88 10E12/L (ref 3.8–5.2)
SODIUM SERPL-SCNC: 138 MMOL/L (ref 133–144)
TRIGL SERPL-MCNC: 154 MG/DL
TSH SERPL DL<=0.005 MIU/L-ACNC: 0.96 MU/L (ref 0.4–4)
WBC # BLD AUTO: 6.7 10E9/L (ref 4–11)

## 2018-08-02 PROCEDURE — 99214 OFFICE O/P EST MOD 30 MIN: CPT | Performed by: FAMILY MEDICINE

## 2018-08-02 PROCEDURE — 80061 LIPID PANEL: CPT | Performed by: FAMILY MEDICINE

## 2018-08-02 PROCEDURE — 80053 COMPREHEN METABOLIC PANEL: CPT | Performed by: FAMILY MEDICINE

## 2018-08-02 PROCEDURE — 85027 COMPLETE CBC AUTOMATED: CPT | Performed by: FAMILY MEDICINE

## 2018-08-02 PROCEDURE — 84443 ASSAY THYROID STIM HORMONE: CPT | Performed by: FAMILY MEDICINE

## 2018-08-02 PROCEDURE — 36415 COLL VENOUS BLD VENIPUNCTURE: CPT | Performed by: FAMILY MEDICINE

## 2018-08-02 RX ORDER — ESCITALOPRAM OXALATE 10 MG/1
10 TABLET ORAL DAILY
Qty: 30 TABLET | Refills: 1 | Status: SHIPPED | OUTPATIENT
Start: 2018-08-02 | End: 2018-09-11

## 2018-08-02 RX ORDER — HYDROCHLOROTHIAZIDE 25 MG/1
25 TABLET ORAL DAILY
Qty: 90 TABLET | Refills: 3 | Status: SHIPPED | OUTPATIENT
Start: 2018-08-02 | End: 2019-09-19

## 2018-08-02 RX ORDER — LEVONORGESTREL AND ETHINYL ESTRADIOL 0.15-0.03
1 KIT ORAL DAILY
Qty: 91 TABLET | Refills: 3 | Status: SHIPPED | OUTPATIENT
Start: 2018-08-02 | End: 2019-08-10

## 2018-08-02 ASSESSMENT — ANXIETY QUESTIONNAIRES
2. NOT BEING ABLE TO STOP OR CONTROL WORRYING: SEVERAL DAYS
7. FEELING AFRAID AS IF SOMETHING AWFUL MIGHT HAPPEN: SEVERAL DAYS
GAD7 TOTAL SCORE: 10
3. WORRYING TOO MUCH ABOUT DIFFERENT THINGS: MORE THAN HALF THE DAYS
6. BECOMING EASILY ANNOYED OR IRRITABLE: MORE THAN HALF THE DAYS
5. BEING SO RESTLESS THAT IT IS HARD TO SIT STILL: SEVERAL DAYS
1. FEELING NERVOUS, ANXIOUS, OR ON EDGE: MORE THAN HALF THE DAYS

## 2018-08-02 ASSESSMENT — PATIENT HEALTH QUESTIONNAIRE - PHQ9: 5. POOR APPETITE OR OVEREATING: SEVERAL DAYS

## 2018-08-02 NOTE — MR AVS SNAPSHOT
After Visit Summary   8/2/2018    Yolanda Barnes    MRN: [id number]           Patient Information     Date Of Birth          1980        Visit Information        Provider Department      8/2/2018 8:00 AM Karon Odell MD [facility]        Today's Diagnoses     SUSANNAH (generalized anxiety disorder)    -  1    Recurrent major depressive disorder, in remission (H)        Benign essential hypertension        CARDIOVASCULAR SCREENING; LDL GOAL LESS THAN 160        Menorrhagia with regular cycle          Care Instructions    Start Lexapro one daily  See me back in a month            Follow-ups after your visit        Who to contact     If you have questions or need follow up information about today's clinic visit or your schedule please contact [facility] directly at [phone].  Normal or non-critical lab and imaging results will be communicated to you by [employer], letter or phone within 4 business days after the clinic has received the results. If you do not hear from us within 7 days, please contact the clinic through [employer] or phone. If you have a critical or abnormal lab result, we will notify you by phone as soon as possible.  Submit refill requests through [employer] or call your pharmacy and they will forward the refill request to us. Please allow 3 business days for your refill to be completed.          Additional Information About Your Visit        MyChart Information     [employer] gives you secure access to your electronic health record. If you see a primary care provider, you can also send messages to your care team and make appointments. If you have questions, please call your primary care clinic.  If you do not have a primary care provider, please call [phone] and they will assist you.        Care EveryWhere ID     This is your Care EveryWhere ID. This could be used by other organizations to access your [facility]  "records  [id number]        Your Vitals Were     Pulse Temperature Respirations Height Pulse Oximetry BMI (Body Mass Index)    94 99.2  F (37.3  C) (Tympanic) 16 5' 1.5\" (1.562 m) 98% 30.49 kg/m2       Blood Pressure from Last 3 Encounters:   08/02/18 122/82   08/18/17 112/78   01/15/17 142/84    Weight from Last 3 Encounters:   08/02/18 164 lb (74.4 kg)   08/18/17 173 lb (78.5 kg)   12/30/16 172 lb (78 kg)              We Performed the Following     CBC with platelets     Comprehensive metabolic panel (BMP + Alb, Alk Phos, ALT, AST, Total. Bili, TP)     Lipid Profile (Chol, Trig, HDL, LDL calc)     TSH with free T4 reflex          Today's Medication Changes          These changes are accurate as of 8/2/18  8:38 AM.  If you have any questions, ask your nurse or doctor.               Start taking these medicines.        Dose/Directions    escitalopram 10 MG tablet   Commonly known as:  LEXAPRO   Used for:  SUSANNAH (generalized anxiety disorder)   Started by:  Karon Odell MD        Dose:  10 mg   Take 1 tablet (10 mg) by mouth daily   Quantity:  30 tablet   Refills:  1            Where to get your medicines      These medications were sent to [facility] [place] [id number]     Phone:  [phone]     escitalopram 10 MG tablet    hydrochlorothiazide 25 MG tablet    levonorgestrel-ethinyl estradiol 0.15-0.03 MG per tablet                Primary Care Provider Office Phone # Fax #    Karon Odell [phone]       [address] [place] [address]        Equal Access to Services     JENNIFFER GIBSON AH: Hadii santa Gonzalez, waaxda luqadaha, qaybta kaalmada adeegyada, cheryl marie. So [facility] [phone].    ATENCIÓN: Si habla español, tiene a paz disposición servicios gratuitos de asistencia lingüística. Llame al [phone].    We comply with applicable federal civil rights laws and " [place] laws. We do not discriminate on the basis of race, color, national origin, age, disability, sex, sexual orientation, or gender identity.            Thank you!     Thank you for choosing [facility]  for your care. Our goal is always to provide you with excellent care. Hearing back from our patients is one way we can continue to improve our services. Please take a few minutes to complete the written survey that you may receive in the mail after your visit with us. Thank you!             Your Updated Medication List - Protect others around you: Learn how to safely use, store and throw away your medicines at www.disposemymeds.org.          This list is accurate as of 8/2/18  8:38 AM.  Always use your most recent med list.                   Brand Name Dispense Instructions for use Diagnosis    escitalopram 10 MG tablet    LEXAPRO    30 tablet    Take 1 tablet (10 mg) by mouth daily    SUSANNAH (generalized anxiety disorder)       fish oil-omega-3 fatty acids 1000 MG capsule      Take 2 g by mouth daily        hydrochlorothiazide 25 MG tablet    HYDRODIURIL    90 tablet    Take 1 tablet (25 mg) by mouth daily    Benign essential hypertension       levonorgestrel-ethinyl estradiol 0.15-0.03 MG per tablet    SEASONALE    91 tablet    Take 1 tablet by mouth daily    Menorrhagia with regular cycle       vitamin D 2000 units Caps

## 2018-08-02 NOTE — PROGRESS NOTES
SUBJECTIVE:   Yolanda Barnes is a 37 year old female who presents to clinic today for the following health issues:      Anxiety Follow-Up    Status since last visit: Worsened     Other associated symptoms:None    Complicating factors:   Significant life event: No   Current substance abuse: None  Depression symptoms: No  SUSANNAH-7 SCORE 8/17/2011   Total Score 2       SUSANNAH-7    Amount of exercise or physical activity: None    Problems taking medications regularly: No    Medication side effects: none    Diet: regular (no restrictions)    Patient has a history of depression when she was quite young and for numerous years.  Had been doing very well for the last 5 or 8 years off of medications for her depression but has been dealing with some anxiety issues.  Excessive worry. Is starting to be intrusive. Is getting exhausted trying to manage it. Becoming irritable and angry. Mental exhaustion.   Doesn't have depression, but did in past.   remembers celexa and Lexapro but doesn't remember what happened with it or if was on it. Her mom is on Lexapro which works well for her.   Was on Pristiq,. Doesn't remember about it.   In past was on fluoxetine-helped, was on for years,  but seemed to stop working after awhile.   Sertraline-worked.   paxil-also worked.   effexor-was difficult to wean off, but gave night sweats and weight gain  She has had counseling in past.     Problem list and histories reviewed & adjusted, as indicated.  Additional history: as documented    BP Readings from Last 3 Encounters:   08/02/18 122/82   08/18/17 112/78   01/15/17 142/84    Wt Readings from Last 3 Encounters:   08/02/18 164 lb (74.4 kg)   08/18/17 173 lb (78.5 kg)   12/30/16 172 lb (78 kg)              Reviewed and updated as needed this visit by clinical staff  Tobacco  Allergies  Med Hx  Surg Hx  Fam Hx  Soc Hx      Reviewed and updated as needed this visit by Provider         OBJECTIVE: /82  Pulse 94  Temp 99.2  F (37.3  C)  "(Tympanic)  Resp 16  Ht 5' 1.5\" (1.562 m)  Wt 164 lb (74.4 kg)  SpO2 98%  BMI 30.49 kg/m2   General: appears well, no distress  Neck: supple, no adenopathy  Heart: regular rate and rhythm, normal S1S2, no murmur  Lungs: clear to ascultation   Psych: Mood:euthymic,  Affect: appropriate, Speech:normal, Thought Processes: normal, Suicidal Ideation: No     ASSESSMENT:  1. SUSANNAH (generalized anxiety disorder)    2. Recurrent major depressive disorder, in remission (H)    3. Benign essential hypertension    4. CARDIOVASCULAR SCREENING; LDL GOAL LESS THAN 160    5. Menorrhagia with regular cycle        PLAN:  Orders Placed This Encounter     Lipid Profile (Chol, Trig, HDL, LDL calc)     Comprehensive metabolic panel (BMP + Alb, Alk Phos, ALT, AST, Total. Bili, TP)     CBC with platelets     TSH with free T4 reflex     escitalopram (LEXAPRO) 10 MG tablet     hydrochlorothiazide (HYDRODIURIL) 25 MG tablet     levonorgestrel-ethinyl estradiol (SEASONALE) 0.15-0.03 MG per tablet     As her mom is doing so well on the Lexapro, we decided to try Lexapro for her as well.  Gone over benefits and risks of medications as well as side effects.  If she does have side effects or this is not effective for her, I think I would retry the fluoxetine as she did well on it in the past.  Recommend counseling.  We will see her back in a month earlier if problems.  25 min spent with patient, greater than 50% in counseling and coordination of care and above discussions.     Patient Instructions   Start Lexapro one daily  See me back in a month         Karon Barrett MD   "

## 2018-08-02 NOTE — NURSING NOTE
"Chief Complaint   Patient presents with     Anxiety     recheck - wants to discuss meds       Initial /82  Pulse 94  Temp 99.2  F (37.3  C) (Tympanic)  Resp 16  Ht 5' 1.5\" (1.562 m)  Wt 164 lb (74.4 kg)  SpO2 98%  BMI 30.49 kg/m2 Estimated body mass index is 30.49 kg/(m^2) as calculated from the following:    Height as of this encounter: 5' 1.5\" (1.562 m).    Weight as of this encounter: 164 lb (74.4 kg).      Health Maintenance that is potentially due pending provider review:  NONE    n/a    Is there anyone who you would like to be able to receive your results? No  If yes have patient fill out WADE    "

## 2018-08-03 ASSESSMENT — ANXIETY QUESTIONNAIRES: GAD7 TOTAL SCORE: 10

## 2018-08-03 ASSESSMENT — PATIENT HEALTH QUESTIONNAIRE - PHQ9: SUM OF ALL RESPONSES TO PHQ QUESTIONS 1-9: 3

## 2018-09-11 ENCOUNTER — OFFICE VISIT (OUTPATIENT)
Dept: FAMILY MEDICINE | Facility: CLINIC | Age: 38
End: 2018-09-11
Payer: COMMERCIAL

## 2018-09-11 VITALS
BODY MASS INDEX: 31.23 KG/M2 | TEMPERATURE: 97.7 F | OXYGEN SATURATION: 96 % | RESPIRATION RATE: 16 BRPM | HEART RATE: 93 BPM | SYSTOLIC BLOOD PRESSURE: 126 MMHG | WEIGHT: 168 LBS | DIASTOLIC BLOOD PRESSURE: 82 MMHG

## 2018-09-11 DIAGNOSIS — Z00.00 ROUTINE GENERAL MEDICAL EXAMINATION AT A HEALTH CARE FACILITY: Primary | ICD-10-CM

## 2018-09-11 DIAGNOSIS — F41.1 GAD (GENERALIZED ANXIETY DISORDER): ICD-10-CM

## 2018-09-11 DIAGNOSIS — F33.40 RECURRENT MAJOR DEPRESSIVE DISORDER, IN REMISSION (H): ICD-10-CM

## 2018-09-11 DIAGNOSIS — I10 BENIGN ESSENTIAL HYPERTENSION: ICD-10-CM

## 2018-09-11 DIAGNOSIS — B36.9 FUNGAL RASH OF TRUNK: ICD-10-CM

## 2018-09-11 PROCEDURE — 99395 PREV VISIT EST AGE 18-39: CPT | Performed by: FAMILY MEDICINE

## 2018-09-11 PROCEDURE — 99213 OFFICE O/P EST LOW 20 MIN: CPT | Mod: 25 | Performed by: FAMILY MEDICINE

## 2018-09-11 RX ORDER — KETOCONAZOLE 20 MG/G
CREAM TOPICAL 2 TIMES DAILY
Qty: 30 G | Refills: 1 | Status: SHIPPED | OUTPATIENT
Start: 2018-09-11 | End: 2019-09-19

## 2018-09-11 RX ORDER — ESCITALOPRAM OXALATE 10 MG/1
10 TABLET ORAL DAILY
Qty: 90 TABLET | Refills: 1 | Status: SHIPPED | OUTPATIENT
Start: 2018-09-11 | End: 2019-03-28

## 2018-09-11 ASSESSMENT — ANXIETY QUESTIONNAIRES
1. FEELING NERVOUS, ANXIOUS, OR ON EDGE: NOT AT ALL
7. FEELING AFRAID AS IF SOMETHING AWFUL MIGHT HAPPEN: NOT AT ALL
7. FEELING AFRAID AS IF SOMETHING AWFUL MIGHT HAPPEN: NOT AT ALL
5. BEING SO RESTLESS THAT IT IS HARD TO SIT STILL: NOT AT ALL
GAD7 TOTAL SCORE: 0
3. WORRYING TOO MUCH ABOUT DIFFERENT THINGS: NOT AT ALL
6. BECOMING EASILY ANNOYED OR IRRITABLE: NOT AT ALL
4. TROUBLE RELAXING: NOT AT ALL
GAD7 TOTAL SCORE: 0
2. NOT BEING ABLE TO STOP OR CONTROL WORRYING: NOT AT ALL
GAD7 TOTAL SCORE: 0

## 2018-09-11 ASSESSMENT — PATIENT HEALTH QUESTIONNAIRE - PHQ9
SUM OF ALL RESPONSES TO PHQ QUESTIONS 1-9: 0
10. IF YOU CHECKED OFF ANY PROBLEMS, HOW DIFFICULT HAVE THESE PROBLEMS MADE IT FOR YOU TO DO YOUR WORK, TAKE CARE OF THINGS AT HOME, OR GET ALONG WITH OTHER PEOPLE: NOT DIFFICULT AT ALL
SUM OF ALL RESPONSES TO PHQ QUESTIONS 1-9: 0

## 2018-09-11 NOTE — NURSING NOTE
"Chief Complaint   Patient presents with     Physical     yearly     Anxiety     consult       Initial /82 (BP Location: Right arm)  Pulse 93  Temp 97.7  F (36.5  C) (Tympanic)  Resp 16  Wt 168 lb (76.2 kg)  LMP 08/20/2018  SpO2 96%  BMI 31.23 kg/m2 Estimated body mass index is 31.23 kg/(m^2) as calculated from the following:    Height as of 8/2/18: 5' 1.5\" (1.562 m).    Weight as of this encounter: 168 lb (76.2 kg).      Health Maintenance that is potentially due pending provider review:  NONE    n/a    Is there anyone who you would like to be able to receive your results? No  If yes have patient fill out WADE    "

## 2018-09-11 NOTE — MR AVS SNAPSHOT
After Visit Summary   9/11/2018    Yolanda Barnes    MRN: [id number]           Patient Information     Date Of Birth          1980        Visit Information        Provider Department      9/11/2018 9:40 AM Karon Odell MD [facility]        Today's Diagnoses     Routine general medical examination at a health care facility    -  1    SUSANNAH (generalized anxiety disorder)        Recurrent major depressive disorder, in remission (H)        Benign essential hypertension          Care Instructions      Preventive Health Recommendations  Female Ages 26 - 39  Yearly exam:   See your health care provider every year in order to    Review health changes.     Discuss preventive care.      Review your medicines if you your doctor has prescribed any.    Until age 30: Get a Pap test every three years (more often if you have had an abnormal result).    After age 30: Talk to your doctor about whether you should have a Pap test every 3 years or have a Pap test with HPV screening every 5 years.   You do not need a Pap test if your uterus was removed (hysterectomy) and you have not had cancer.  You should be tested each year for STDs (sexually transmitted diseases), if you're at risk.   Talk to your provider about how often to have your cholesterol checked.  If you are at risk for diabetes, you should have a diabetes test (fasting glucose).  Shots: Get a flu shot each year. Get a tetanus shot every 10 years.   Nutrition:     Eat at least 5 servings of fruits and vegetables each day.    Eat whole-grain bread, whole-wheat pasta and brown rice instead of white grains and rice.    Get adequate Calcium and Vitamin D.     Lifestyle    Exercise at least 150 minutes a week (30 minutes a day, 5 days of the week). This will help you control your weight and prevent disease.    Limit alcohol to one drink per day.    No smoking.     Wear sunscreen to prevent skin cancer.    See your dentist every six  months for an exam and cleaning.            Follow-ups after your visit        Who to contact     If you have questions or need follow up information about today's clinic visit or your schedule please contact [facility] directly at [phone].  Normal or non-critical lab and imaging results will be communicated to you by MyChart, letter or phone within 4 business days after the clinic has received the results. If you do not hear from us within 7 days, please contact the clinic through [employer] or phone. If you have a critical or abnormal lab result, we will notify you by phone as soon as possible.  Submit refill requests through [employer] or call your pharmacy and they will forward the refill request to us. Please allow 3 business days for your refill to be completed.          Additional Information About Your Visit        [employer] Information     [employer] gives you secure access to your electronic health record. If you see a primary care provider, you can also send messages to your care team and make appointments. If you have questions, please call your primary care clinic.  If you do not have a primary care provider, please call [phone] and they will assist you.        Care EveryWhere ID     This is your Care EveryWhere ID. This could be used by other organizations to access your [place] medical records  [id number]        Your Vitals Were     Pulse Temperature Respirations Last Period Pulse Oximetry BMI (Body Mass Index)    93 97.7  F (36.5  C) (Tympanic) 16 08/20/2018 96% 31.23 kg/m2       Blood Pressure from Last 3 Encounters:   09/11/18 126/82   08/02/18 122/82   08/18/17 112/78    Weight from Last 3 Encounters:   09/11/18 168 lb (76.2 kg)   08/02/18 164 lb (74.4 kg)   08/18/17 173 lb (78.5 kg)              Today, you had the following     No orders found for display         Where to get your medicines      These medications were sent to [facility] [place], [place]  - [address] [place], [place] [id number] [address] [place] [address]     Phone:  [phone]     escitalopram 10 MG tablet          Primary Care Provider Office Phone # Fax #    Karon Odell [phone] [address] [place] [address]        Equal Access to Services     YESICA GIBSON : Hadii aad ku hadasho Soomaali, waaxda luqadaha, qaybta kaalmada adeegyada, waxmeka dennisin haylexin adeleydi paulokya marie. So [facility] [phone].    ATENCIÓN: Si habla español, tiene a paz disposición servicios gratuitos de asistencia lingüística. [facility] [phone].    We comply with applicable federal civil rights laws and [place] laws. We do not discriminate on the basis of race, color, national origin, age, disability, sex, sexual orientation, or gender identity.            Thank you!     Thank you for choosing [facility]  for your care. Our goal is always to provide you with excellent care. Hearing back from our patients is one way we can continue to improve our services. Please take a few minutes to complete the written survey that you may receive in the mail after your visit with us. Thank you!             Your Updated Medication List - Protect others around you: Learn how to safely use, store and throw away your medicines at www.disposemymeds.org.          This list is accurate as of 9/11/18 10:23 AM.  Always use your most recent med list.                   Brand Name Dispense Instructions for use Diagnosis    escitalopram 10 MG tablet    LEXAPRO    90 tablet    Take 1 tablet (10 mg) by mouth daily    SUSANNAH (generalized anxiety disorder)       fish oil-omega-3 fatty acids 1000 MG capsule      Take 2 g by mouth daily        hydrochlorothiazide 25 MG tablet    HYDRODIURIL    90 tablet    Take 1 tablet (25 mg) by mouth daily    Benign essential hypertension       levonorgestrel-ethinyl estradiol 0.15-0.03 MG per tablet    SEASONALE    91 tablet    Take 1 tablet by mouth daily    Menorrhagia  with regular cycle       vitamin D 2000 units Caps

## 2018-09-11 NOTE — PROGRESS NOTES
SUBJECTIVE:   CC: Yolanda Barnes is an 37 year old woman who presents for preventive health visit.   Answers for HPI/ROS submitted by the patient on 9/11/2018   Annual Exam:  Getting at least 3 servings of Calcium per day:: Yes  Bi-annual eye exam:: NO  Dental care twice a year:: Yes  Sleep apnea or symptoms of sleep apnea:: Excessive snoring  Taking medications regularly:: Yes  Medication side effects:: Other  Additional concerns today:: No  PHQ-2 Score: 0  If you checked off any problems, how difficult have these problems made it for you to do your work, take care of things at home, or get along with other people?: Not difficult at all  PHQ9 TOTAL SCORE: 0  SUSANNAH 7 TOTAL SCORE: 0      Anxiety Follow-Up    Status since last visit: No change    Other associated symptoms:None    Complicating factors:   Significant life event: No   Current substance abuse: None  Depression symptoms: No  SUSANNAH-7 SCORE 8/17/2011 8/2/2018 9/11/2018   Total Score 2 - -   Total Score - - 0 (minimal anxiety)   Total Score - 10 0       SUSANNAH-7    Today's PHQ-2 Score:   PHQ-2 ( 1999 [employer]) 9/11/2018 8/15/2017   Q1: Little interest or pleasure in doing things 0 0   Q2: Feeling down, depressed or hopeless 0 0   PHQ-2 Score 0 0   Q1: Little interest or pleasure in doing things Not at all Not at all   Q2: Feeling down, depressed or hopeless Not at all Not at all   PHQ-2 Score 0 0     Doing very well on Lexapro, much better. Tolerating medications well without significant side effects, maybe some decrease in libido.     hypertension -on hydrochlorothiazide   BP Readings from Last 6 Encounters:   09/11/18 126/82   08/02/18 122/82   08/18/17 112/78   01/15/17 142/84   12/30/16 (!) 154/98   11/07/16 (!) 150/95      Rash under breasts for long time, intermittent    Abuse: Current or Past(Physical, Sexual or Emotional)- No  Do you feel safe in your environment - Yes    Social History   Substance Use Topics     Smoking status: Never Smoker      Smokeless tobacco: Never Used     Alcohol use No      Comment: rare- quit with pregnancy     If you drink alcohol do you typically have >3 drinks per day or >7 drinks per week? No                     Reviewed orders with patient.  Reviewed health maintenance and updated orders accordingly - Yes  BP Readings from Last 3 Encounters:   18 126/82   18 122/82   17 112/78    Wt Readings from Last 3 Encounters:   18 168 lb (76.2 kg)   18 164 lb (74.4 kg)   17 173 lb (78.5 kg)                    Mammogram not appropriate for this patient based on age.    Pertinent mammograms are reviewed under the imaging tab.  History of abnormal Pap smear:   Last 3 Pap and HPV Results:   PAP / HPV Latest Ref Rng & Units 2017 2014 10/26/2012   PAP - NIL ASC-US(A) NIL   HPV 16 DNA NEG:Negative Negative - -   HPV 18 DNA NEG:Negative Negative - -   OTHER HR HPV NEG:Negative Negative - -     PAP / HPV Latest Ref Rng & Units 2017 2014 10/26/2012   PAP - NIL ASC-US(A) NIL   HPV 16 DNA NEG:Negative Negative - -   HPV 18 DNA NEG:Negative Negative - -   OTHER HR HPV NEG:Negative Negative - -     Reviewed and updated as needed this visit by clinical staff  Tobacco  Allergies  Meds  Problems  Med Hx  Surg Hx  Fam Hx  Soc Hx          Reviewed and updated as needed this visit by Provider  Allergies  Meds  Problems        Past Medical History:   Diagnosis Date     Anxiety      ASCUS favor benign 2014    Neg HPV - cotest in 3 yrs     Chickenpox      Dysplasia of cervix, unspecified age 17    [facility]     Human papillomavirus in conditions classified elsewhere and of unspecified site     age 18     Irritable bowel syndrome      Major depressive disorder, recurrent episode, moderate (H)     major depressive affective disorder, recurrent episodesDr. MER Loera, psychiatrist.       Past Surgical History:   Procedure Laterality Date       SECTION  2013    Procedure:  SECTION;   section;  Surgeon: Nimco Powell MD;  Location: [facility]      SECTION, TUBAL LIGATION, COMBINED N/A 2015    Procedure: COMBINED  SECTION, TUBAL LIGATION;  Surgeon: Karon Beckett MD;  Location: [facility]     MOUTH SURGERY      wisdom teeth     Obstetric History       T2      L2     SAB0   TAB0   Ectopic0   Multiple0   Live Births2       # Outcome Date GA Lbr Jesus/2nd Weight Sex Delivery Anes PTL Lv   2 Term 02/06/15 37w6d  8 lb 1 oz (3.657 kg) F CS-LTranv Spinal  SAM      Apgar1:  7                Apgar5: 8   1 Term 13 39w0d  7 lb 4 oz (3.289 kg) M CS-LTranv EPI N SAM      Name: CHIDI MYRICK      Apgar1:  3                Apgar5: 7          ROS:  CONSTITUTIONAL: NEGATIVE for fever, chills, change in weight  INTEGUMENTARU/SKIN: positive for rashes under breasts  EYES: NEGATIVE for vision changes or irritation  ENT: NEGATIVE for ear, mouth and throat problems  RESP: NEGATIVE for significant cough or SOB  BREAST: NEGATIVE for masses, tenderness or discharge  CV: NEGATIVE for chest pain, palpitations or peripheral edema  GI: NEGATIVE for nausea, abdominal pain, heartburn, or change in bowel habits  : NEGATIVE for unusual urinary or vaginal symptoms. Periods are regular.  MUSCULOSKELETAL: NEGATIVE for significant arthralgias or myalgia  NEURO: NEGATIVE for weakness, dizziness or paresthesias  PSYCHIATRIC: NEGATIVE for changes in mood or affect  Skin erythematous macular rash under breasts    OBJECTIVE:   /82 (BP Location: Right arm)  Pulse 93  Temp 97.7  F (36.5  C) (Tympanic)  Resp 16  Wt 168 lb (76.2 kg)  LMP 2018  SpO2 96%  BMI 31.23 kg/m2  EXAM:  GENERAL: healthy, alert and no distress  EYES: Eyes grossly normal to inspection, PERRL and conjunctivae and sclerae normal  HENT: ear canals and TM's normal, nose and mouth without ulcers or lesions  NECK: no adenopathy, no asymmetry,  "masses, or scars and thyroid normal to palpation  RESP: lungs clear to auscultation - no rales, rhonchi or wheezes  BREAST: normal without masses, tenderness or nipple discharge and no palpable axillary masses or adenopathy  CV: regular rate and rhythm, normal S1 S2, no S3 or S4, no murmur, click or rub, no peripheral edema and peripheral pulses strong  ABDOMEN: soft, nontender, no hepatosplenomegaly, no masses and bowel sounds normal  MS: no gross musculoskeletal defects noted, no edema  SKIN: no suspicious lesions or rashes  NEURO: Normal strength and tone, mentation intact and speech normal  PSYCH: mentation appears normal, affect normal/bright    ASSESSMENT/PLAN:   Yolanda was seen today for physical and anxiety.    Diagnoses and all orders for this visit:    Routine general medical examination at a health care facility    SUSANNAH (generalized anxiety disorder)  -     escitalopram (LEXAPRO) 10 MG tablet; Take 1 tablet (10 mg) by mouth daily  -     OFFICE/OUTPT VISIT,EST,LEVL III    Recurrent major depressive disorder, in remission (H)  -     OFFICE/OUTPT VISIT,EST,LEVL III    Benign essential hypertension  -     OFFICE/OUTPT VISIT,EST,LEVL III    Fungal rash of trunk  -     ketoconazole (NIZORAL) 2 % cream; Apply topically 2 times daily  -     OFFICE/OUTPT VISIT,EST,LEVL III        COUNSELING:   Reviewed preventive health counseling, as reflected in patient instructions       Regular exercise       Healthy diet/nutrition       Vision screening    BP Readings from Last 1 Encounters:   09/11/18 126/82     Estimated body mass index is 31.23 kg/(m^2) as calculated from the following:    Height as of 8/2/18: 5' 1.5\" (1.562 m).    Weight as of this encounter: 168 lb (76.2 kg).    BP Screening:   Last 3 BP Readings:    BP Readings from Last 3 Encounters:   09/11/18 126/82   08/02/18 122/82   08/18/17 112/78       The following was recommended to the patient:  hypertension diagnosis   Weight management plan: Discussed healthy " diet and exercise guidelines and patient will follow up in 12 months in clinic to re-evaluate.     reports that she has never smoked. She has never used smokeless tobacco.      Counseling Resources:  ATP IV Guidelines  Pooled Cohorts Equation Calculator  Breast Cancer Risk Calculator  FRAX Risk Assessment  ICSI Preventive Guidelines  Dietary Guidelines for Americans, 2010  USDA's MyPlate  ASA Prophylaxis  Lung CA Screening    Karon Odell MD  [facility]

## 2018-09-12 ASSESSMENT — ANXIETY QUESTIONNAIRES: GAD7 TOTAL SCORE: 0

## 2018-09-12 ASSESSMENT — PATIENT HEALTH QUESTIONNAIRE - PHQ9: SUM OF ALL RESPONSES TO PHQ QUESTIONS 1-9: 0

## 2019-03-28 ENCOUNTER — MYC MEDICAL ADVICE (OUTPATIENT)
Dept: FAMILY MEDICINE | Facility: CLINIC | Age: 39
End: 2019-03-28

## 2019-03-28 ENCOUNTER — MYC REFILL (OUTPATIENT)
Dept: FAMILY MEDICINE | Facility: CLINIC | Age: 39
End: 2019-03-28

## 2019-03-28 DIAGNOSIS — F41.1 GAD (GENERALIZED ANXIETY DISORDER): ICD-10-CM

## 2019-03-28 RX ORDER — ESCITALOPRAM OXALATE 10 MG/1
10 TABLET ORAL DAILY
Qty: 90 TABLET | Refills: 1 | Status: SHIPPED | OUTPATIENT
Start: 2019-03-28 | End: 2019-09-19

## 2019-03-28 ASSESSMENT — PATIENT HEALTH QUESTIONNAIRE - PHQ9
10. IF YOU CHECKED OFF ANY PROBLEMS, HOW DIFFICULT HAVE THESE PROBLEMS MADE IT FOR YOU TO DO YOUR WORK, TAKE CARE OF THINGS AT HOME, OR GET ALONG WITH OTHER PEOPLE: NOT DIFFICULT AT ALL
SUM OF ALL RESPONSES TO PHQ QUESTIONS 1-9: 0
SUM OF ALL RESPONSES TO PHQ QUESTIONS 1-9: 0

## 2019-03-29 ASSESSMENT — PATIENT HEALTH QUESTIONNAIRE - PHQ9: SUM OF ALL RESPONSES TO PHQ QUESTIONS 1-9: 0

## 2019-09-16 ASSESSMENT — ENCOUNTER SYMPTOMS
SORE THROAT: 0
NAUSEA: 0
MYALGIAS: 0
CHILLS: 0
WEAKNESS: 0
DIARRHEA: 0
BREAST MASS: 0
SHORTNESS OF BREATH: 0
HEMATURIA: 0
ABDOMINAL PAIN: 0
COUGH: 0
HEMATOCHEZIA: 0
NERVOUS/ANXIOUS: 0
JOINT SWELLING: 0
FREQUENCY: 0
CONSTIPATION: 0
ARTHRALGIAS: 0
FEVER: 0
PALPITATIONS: 0
EYE PAIN: 0
HEARTBURN: 0
HEADACHES: 0
PARESTHESIAS: 0
DIZZINESS: 0
DYSURIA: 0

## 2019-09-19 ENCOUNTER — OFFICE VISIT (OUTPATIENT)
Dept: FAMILY MEDICINE | Facility: CLINIC | Age: 39
End: 2019-09-19
Payer: COMMERCIAL

## 2019-09-19 VITALS
BODY MASS INDEX: 35.45 KG/M2 | SYSTOLIC BLOOD PRESSURE: 142 MMHG | TEMPERATURE: 98.8 F | HEIGHT: 61 IN | DIASTOLIC BLOOD PRESSURE: 94 MMHG | WEIGHT: 187.8 LBS | RESPIRATION RATE: 18 BRPM | HEART RATE: 106 BPM | OXYGEN SATURATION: 98 %

## 2019-09-19 DIAGNOSIS — F41.1 GAD (GENERALIZED ANXIETY DISORDER): ICD-10-CM

## 2019-09-19 DIAGNOSIS — E66.01 MORBID OBESITY (H): ICD-10-CM

## 2019-09-19 DIAGNOSIS — R01.1 UNDIAGNOSED CARDIAC MURMURS: ICD-10-CM

## 2019-09-19 DIAGNOSIS — N92.0 MENORRHAGIA WITH REGULAR CYCLE: ICD-10-CM

## 2019-09-19 DIAGNOSIS — R73.9 ELEVATED BLOOD SUGAR: ICD-10-CM

## 2019-09-19 DIAGNOSIS — G47.33 OSA (OBSTRUCTIVE SLEEP APNEA): ICD-10-CM

## 2019-09-19 DIAGNOSIS — I10 BENIGN ESSENTIAL HYPERTENSION: ICD-10-CM

## 2019-09-19 DIAGNOSIS — Z13.6 CARDIOVASCULAR SCREENING; LDL GOAL LESS THAN 160: ICD-10-CM

## 2019-09-19 DIAGNOSIS — F33.40 RECURRENT MAJOR DEPRESSIVE DISORDER, IN REMISSION (H): ICD-10-CM

## 2019-09-19 DIAGNOSIS — Z00.00 ROUTINE GENERAL MEDICAL EXAMINATION AT A HEALTH CARE FACILITY: Primary | ICD-10-CM

## 2019-09-19 LAB
ALBUMIN SERPL-MCNC: 3.7 G/DL (ref 3.4–5)
ALP SERPL-CCNC: 44 U/L (ref 40–150)
ALT SERPL W P-5'-P-CCNC: 31 U/L (ref 0–50)
ANION GAP SERPL CALCULATED.3IONS-SCNC: 6 MMOL/L (ref 3–14)
AST SERPL W P-5'-P-CCNC: 18 U/L (ref 0–45)
BILIRUB SERPL-MCNC: 0.1 MG/DL (ref 0.2–1.3)
BUN SERPL-MCNC: 12 MG/DL (ref 7–30)
CALCIUM SERPL-MCNC: 9.5 MG/DL (ref 8.5–10.1)
CHLORIDE SERPL-SCNC: 101 MMOL/L (ref 94–109)
CHOLEST SERPL-MCNC: 256 MG/DL
CO2 SERPL-SCNC: 29 MMOL/L (ref 20–32)
CREAT SERPL-MCNC: 0.67 MG/DL (ref 0.52–1.04)
ERYTHROCYTE [DISTWIDTH] IN BLOOD BY AUTOMATED COUNT: 12.7 % (ref 10–15)
GFR SERPL CREATININE-BSD FRML MDRD: >90 ML/MIN/{1.73_M2}
GLUCOSE SERPL-MCNC: 144 MG/DL (ref 70–99)
HCT VFR BLD AUTO: 43.3 % (ref 35–47)
HDLC SERPL-MCNC: 39 MG/DL
HGB BLD-MCNC: 14.3 G/DL (ref 11.7–15.7)
LDLC SERPL CALC-MCNC: 160 MG/DL
MCH RBC QN AUTO: 29.7 PG (ref 26.5–33)
MCHC RBC AUTO-ENTMCNC: 33 G/DL (ref 31.5–36.5)
MCV RBC AUTO: 90 FL (ref 78–100)
NONHDLC SERPL-MCNC: 217 MG/DL
PLATELET # BLD AUTO: 246 10E9/L (ref 150–450)
POTASSIUM SERPL-SCNC: 3.8 MMOL/L (ref 3.4–5.3)
PROT SERPL-MCNC: 7.2 G/DL (ref 6.8–8.8)
RBC # BLD AUTO: 4.81 10E12/L (ref 3.8–5.2)
SODIUM SERPL-SCNC: 136 MMOL/L (ref 133–144)
TRIGL SERPL-MCNC: 283 MG/DL
TSH SERPL DL<=0.005 MIU/L-ACNC: 1.07 MU/L (ref 0.4–4)
WBC # BLD AUTO: 7.1 10E9/L (ref 4–11)

## 2019-09-19 PROCEDURE — 80061 LIPID PANEL: CPT | Performed by: FAMILY MEDICINE

## 2019-09-19 PROCEDURE — 80053 COMPREHEN METABOLIC PANEL: CPT | Performed by: FAMILY MEDICINE

## 2019-09-19 PROCEDURE — 84443 ASSAY THYROID STIM HORMONE: CPT | Performed by: FAMILY MEDICINE

## 2019-09-19 PROCEDURE — 99214 OFFICE O/P EST MOD 30 MIN: CPT | Mod: 25 | Performed by: FAMILY MEDICINE

## 2019-09-19 PROCEDURE — 83036 HEMOGLOBIN GLYCOSYLATED A1C: CPT | Performed by: FAMILY MEDICINE

## 2019-09-19 PROCEDURE — 36415 COLL VENOUS BLD VENIPUNCTURE: CPT | Performed by: FAMILY MEDICINE

## 2019-09-19 PROCEDURE — 99395 PREV VISIT EST AGE 18-39: CPT | Performed by: FAMILY MEDICINE

## 2019-09-19 PROCEDURE — 85027 COMPLETE CBC AUTOMATED: CPT | Performed by: FAMILY MEDICINE

## 2019-09-19 RX ORDER — ESCITALOPRAM OXALATE 10 MG/1
10 TABLET ORAL DAILY
Qty: 90 TABLET | Refills: 3 | Status: SHIPPED | OUTPATIENT
Start: 2019-09-19 | End: 2020-10-14

## 2019-09-19 RX ORDER — LISINOPRIL 5 MG/1
5 TABLET ORAL DAILY
Qty: 30 TABLET | Refills: 3 | Status: SHIPPED | OUTPATIENT
Start: 2019-09-19 | End: 2019-12-30

## 2019-09-19 RX ORDER — LEVONORGESTREL AND ETHINYL ESTRADIOL 0.15-0.03
1 KIT ORAL DAILY
Qty: 91 TABLET | Refills: 3 | Status: SHIPPED | OUTPATIENT
Start: 2019-09-19 | End: 2020-08-04

## 2019-09-19 RX ORDER — HYDROCHLOROTHIAZIDE 25 MG/1
25 TABLET ORAL DAILY
Qty: 90 TABLET | Refills: 3 | Status: SHIPPED | OUTPATIENT
Start: 2019-09-19 | End: 2020-09-10

## 2019-09-19 ASSESSMENT — ENCOUNTER SYMPTOMS
SHORTNESS OF BREATH: 0
COUGH: 0
DYSURIA: 0
EYE PAIN: 0
CHILLS: 0
JOINT SWELLING: 0
WEAKNESS: 0
SORE THROAT: 0
ARTHRALGIAS: 0
ABDOMINAL PAIN: 0
DIZZINESS: 0
FREQUENCY: 0
BREAST MASS: 0
MYALGIAS: 0
NAUSEA: 0
CONSTIPATION: 0
HEMATOCHEZIA: 0
HEARTBURN: 0
NERVOUS/ANXIOUS: 0
PALPITATIONS: 0
HEMATURIA: 0
DIARRHEA: 0
HEADACHES: 0
PARESTHESIAS: 0
FEVER: 0

## 2019-09-19 ASSESSMENT — ANXIETY QUESTIONNAIRES
6. BECOMING EASILY ANNOYED OR IRRITABLE: SEVERAL DAYS
GAD7 TOTAL SCORE: 1
3. WORRYING TOO MUCH ABOUT DIFFERENT THINGS: NOT AT ALL
5. BEING SO RESTLESS THAT IT IS HARD TO SIT STILL: NOT AT ALL
2. NOT BEING ABLE TO STOP OR CONTROL WORRYING: NOT AT ALL
IF YOU CHECKED OFF ANY PROBLEMS ON THIS QUESTIONNAIRE, HOW DIFFICULT HAVE THESE PROBLEMS MADE IT FOR YOU TO DO YOUR WORK, TAKE CARE OF THINGS AT HOME, OR GET ALONG WITH OTHER PEOPLE: NOT DIFFICULT AT ALL
1. FEELING NERVOUS, ANXIOUS, OR ON EDGE: NOT AT ALL
7. FEELING AFRAID AS IF SOMETHING AWFUL MIGHT HAPPEN: NOT AT ALL

## 2019-09-19 ASSESSMENT — PATIENT HEALTH QUESTIONNAIRE - PHQ9
SUM OF ALL RESPONSES TO PHQ QUESTIONS 1-9: 3
5. POOR APPETITE OR OVEREATING: NOT AT ALL

## 2019-09-19 ASSESSMENT — MIFFLIN-ST. JEOR: SCORE: 1476.49

## 2019-09-19 NOTE — PATIENT INSTRUCTIONS
See the sleep doctor    Labs today    Start the lisinopril one daily  Monitor blood pressures     See darlin renae  [phone]        Preventive Health Recommendations  Female Ages 26 - 39  Yearly exam:   See your health care provider every year in order to    Review health changes.     Discuss preventive care.      Review your medicines if you your doctor has prescribed any.    Until age 30: Get a Pap test every three years (more often if you have had an abnormal result).    After age 30: Talk to your doctor about whether you should have a Pap test every 3 years or have a Pap test with HPV screening every 5 years.   You do not need a Pap test if your uterus was removed (hysterectomy) and you have not had cancer.  You should be tested each year for STDs (sexually transmitted diseases), if you're at risk.   Talk to your provider about how often to have your cholesterol checked.  If you are at risk for diabetes, you should have a diabetes test (fasting glucose).  Shots: Get a flu shot each year. Get a tetanus shot every 10 years.   Nutrition:     Eat at least 5 servings of fruits and vegetables each day.    Eat whole-grain bread, whole-wheat pasta and brown rice instead of white grains and rice.    Get adequate Calcium and Vitamin D.     Lifestyle    Exercise at least 150 minutes a week (30 minutes a day, 5 days of the week). This will help you control your weight and prevent disease.    Limit alcohol to one drink per day.    No smoking.     Wear sunscreen to prevent skin cancer.    See your dentist every six months for an exam and cleaning.

## 2019-09-19 NOTE — PROGRESS NOTES
a1c     SUBJECTIVE:   CC: Yolanda Barnes is an 38 year old woman who presents for preventive health visit.     Healthy Habits:     Getting at least 3 servings of Calcium per day:  Yes    Bi-annual eye exam:  NO    Dental care twice a year:  Yes    Sleep apnea or symptoms of sleep apnea:  Excessive snoring    Diet:  Regular (no restrictions)    Frequency of exercise:  1 day/week    Duration of exercise:  Less than 15 minutes    Taking medications regularly:  No    Barriers to taking medications:  Problems remembering to take them    Medication side effects:  None    PHQ-2 Total Score: 0    Additional concerns today:  Yes      Hypertension  On hydrochlorothiazide   Always elevated on current meds at home  BP Readings from Last 6 Encounters:   09/19/19 (!) 142/94   09/11/18 126/82   08/02/18 122/82   08/18/17 112/78   01/15/17 142/84   12/30/16 (!) 154/98      Depression/anxiety  On lexapro, doing ok. Her weight gain has her down. Wants to stay on it    Snoring-for a good year, so loud that  can't sleep with her.   Sister slept with her and says that she quits breathing in her sleep.   Mom has sleep apnea.     Obesity  She thinks her weight is contributing to her depression. She asks if I can help her with it.     Today's PHQ-2 Score:   PHQ-2 ( 1999 Pfizer) 9/19/2019   Q1: Little interest or pleasure in doing things 0   Q2: Feeling down, depressed or hopeless 0   PHQ-2 Score 0   Q1: Little interest or pleasure in doing things -   Q2: Feeling down, depressed or hopeless -   PHQ-2 Score -       Abuse: Current or Past(Physical, Sexual or Emotional)- No  Do you feel safe in your environment? Yes    Social History     Tobacco Use     Smoking status: Never Smoker     Smokeless tobacco: Never Used   Substance Use Topics     Alcohol use: No     Comment: rare- quit with pregnancy     If you drink alcohol do you typically have >3 drinks per day or >7 drinks per week? No    Alcohol Use 9/19/2019   Prescreen: >3  drinks/day or >7 drinks/week? -   Prescreen: >3 drinks/day or >7 drinks/week? No       Reviewed orders with patient.  Reviewed health maintenance and updated orders accordingly - Yes  BP Readings from Last 3 Encounters:   09/19/19 (!) 142/94   09/11/18 126/82   08/02/18 122/82    Wt Readings from Last 3 Encounters:   09/19/19 85.2 kg (187 lb 12.8 oz)   09/11/18 76.2 kg (168 lb)   08/02/18 74.4 kg (164 lb)            Recent Labs   Lab Test 08/02/18  0848 08/18/17  0904   CHOL 230* 233*   HDL 42* 53   * 149*   TRIG 154* 153*        Mammogram not appropriate for this patient based on age.    Pertinent mammograms are reviewed under the imaging tab.  History of abnormal Pap smear:   Last 3 Pap and HPV Results:   PAP / HPV Latest Ref Rng & Units 8/18/2017 8/12/2014 10/26/2012   PAP - NIL ASC-US(A) NIL   HPV 16 DNA NEG:Negative Negative - -   HPV 18 DNA NEG:Negative Negative - -   OTHER HR HPV NEG:Negative Negative - -     PAP / HPV Latest Ref Rng & Units 8/18/2017 8/12/2014 10/26/2012   PAP - NIL ASC-US(A) NIL   HPV 16 DNA NEG:Negative Negative - -   HPV 18 DNA NEG:Negative Negative - -   OTHER HR HPV NEG:Negative Negative - -     Reviewed and updated as needed this visit by clinical staff  Tobacco  Allergies  Meds  Problems  Med Hx  Surg Hx  Fam Hx  Soc Hx          Reviewed and updated as needed this visit by Provider  Tobacco  Allergies  Meds  Problems  Med Hx  Surg Hx  Fam Hx        Past Medical History:   Diagnosis Date     Anxiety      ASCUS favor benign 8/2014    Neg HPV - cotest in 3 yrs     Chickenpox      Dysplasia of cervix, unspecified age 17    [facility]     Human papillomavirus in conditions classified elsewhere and of unspecified site     age 18     Irritable bowel syndrome 1/05     Major depressive disorder, recurrent episode, moderate (H)     major depressive affective disorder, recurrent episodesDr. MER Loera, psychiatrist.       Past  "Surgical History:   Procedure Laterality Date      SECTION  2013    Procedure:  SECTION;   section;  Surgeon: Nimco Powell MD;  Location: [facility]      SECTION, TUBAL LIGATION, COMBINED N/A 2015    Procedure: COMBINED  SECTION, TUBAL LIGATION;  Surgeon: Karon Beckett MD;  Location: [facility]     MOUTH SURGERY      wisdom teeth     OB History    Para Term  AB Living   2 2 2 0 0 2   SAB TAB Ectopic Multiple Live Births   0 0 0 0 2      # Outcome Date GA Lbr Jesus/2nd Weight Sex Delivery Anes PTL Lv   2 Term 02/06/15 37w6d  3.657 kg (8 lb 1 oz) F CS-LTranv Spinal  SAM      Apgar1: 7  Apgar5: 8   1 Term 13 39w0d  3.289 kg (7 lb 4 oz) M CS-LTranv EPI N SAM      Name: CHIDI MYRICK      Apgar1: 3  Apgar5: 7       Review of Systems   Constitutional: Negative for chills and fever.   HENT: Negative for congestion, ear pain, hearing loss and sore throat.    Eyes: Negative for pain and visual disturbance.   Respiratory: Negative for cough and shortness of breath.    Cardiovascular: Negative for chest pain, palpitations and peripheral edema.   Gastrointestinal: Negative for abdominal pain, constipation, diarrhea, heartburn, hematochezia and nausea.   Breasts:  Negative for tenderness, breast mass and discharge.   Genitourinary: Negative for dysuria, frequency, genital sores, hematuria, pelvic pain, urgency, vaginal bleeding and vaginal discharge.   Musculoskeletal: Negative for arthralgias, joint swelling and myalgias.   Skin: Negative for rash.   Neurological: Negative for dizziness, weakness, headaches and paresthesias.   Psychiatric/Behavioral: Negative for mood changes. The patient is not nervous/anxious.         OBJECTIVE:   BP (!) 142/94 (BP Location: Right arm, Patient Position: Sitting, Cuff Size: Adult Regular)   Pulse 106   Temp 98.8  F (37.1  C) (Tympanic)   Resp 18   Ht 1.553 m (5' 1.14\")   Wt 85.2 kg (187 lb 12.8 oz)   SpO2 " 98%   BMI 35.32 kg/m    Physical Exam  GENERAL: healthy, alert and no distress  EYES: Eyes grossly normal to inspection, PERRL and conjunctivae and sclerae normal  HENT: ear canals and TM's normal, nose and mouth without ulcers or lesions  NECK: no adenopathy, no asymmetry, masses, or scars and thyroid normal to palpation  RESP: lungs clear to auscultation - no rales, rhonchi or wheezes  BREAST: normal without masses, tenderness or nipple discharge and no palpable axillary masses or adenopathy  CV: regular rate and rhythm, normal S1 S2, no S3 or S4, 1-2/6 systolic ejection murmur   ABDOMEN: soft, nontender, no hepatosplenomegaly, no masses and bowel sounds normal  MS: no gross musculoskeletal defects noted, no edema  SKIN: no suspicious lesions or rashes  NEURO: Normal strength and tone, mentation intact and speech normal  PSYCH: mentation appears normal, affect normal/bright      ASSESSMENT/PLAN:   Yolanda was seen today for physical.    Diagnoses and all orders for this visit:    Routine general medical examination at a health care facility    SUSANNAH (generalized anxiety disorder)  -     escitalopram (LEXAPRO) 10 MG tablet; Take 1 tablet (10 mg) by mouth daily  -     OFFICE/OUTPT VISIT,EST,LEVL IV    Recurrent major depressive disorder, in remission (H)  -     OFFICE/OUTPT VISIT,EST,LEVL IV    Benign essential hypertension  -     lisinopril (PRINIVIL/ZESTRIL) 5 MG tablet; Take 1 tablet (5 mg) by mouth daily  -     hydrochlorothiazide (HYDRODIURIL) 25 MG tablet; Take 1 tablet (25 mg) by mouth daily  -     Comprehensive metabolic panel (BMP + Alb, Alk Phos, ALT, AST, Total. Bili, TP)  -     CBC with platelets  -     TSH with free T4 reflex  -     OFFICE/OUTPT VISIT,EST,LEVL IV    CARDIOVASCULAR SCREENING; LDL GOAL LESS THAN 160  -     Lipid panel reflex to direct LDL Fasting    Morbid obesity (H)  -     NUTRITION REFERRAL  -     TSH with free T4 reflex  -     OFFICE/OUTPT VISIT,EST,LEVL IV    Menorrhagia with regular  "cycle  -     levonorgestrel-ethinyl estradiol (SEASONALE) 0.15-0.03 MG tablet; Take 1 tablet by mouth daily    GEOVANI (obstructive sleep apnea)  -     SLEEP EVALUATION & MANAGEMENT REFERRAL - ADULT -[facility]  [phone] (Age 2 and up); Future  -     OFFICE/OUTPT VISIT,EST,LEVL IV    Undiagnosed cardiac murmurs  -     Echocardiogram Complete; Future  -     OFFICE/OUTPT VISIT,EST,LEVL IV    Other orders  -     INFLUENZA VACCINE IM > 6 MONTHS VALENT IIV4 [16897]    See the sleep doctor    Labs today    Start the lisinopril one daily  Monitor blood pressures     See dietitian     echocardiogram  [phone]    COUNSELING:  Reviewed preventive health counseling, as reflected in patient instructions    Estimated body mass index is 35.32 kg/m  as calculated from the following:    Height as of this encounter: 1.553 m (5' 1.14\").    Weight as of this encounter: 85.2 kg (187 lb 12.8 oz).    Weight management plan: Discussed healthy diet and exercise guidelines nutrition referral     reports that she has never smoked. She has never used smokeless tobacco.      Counseling Resources:  ATP IV Guidelines  Pooled Cohorts Equation Calculator  Breast Cancer Risk Calculator  FRAX Risk Assessment  ICSI Preventive Guidelines  Dietary Guidelines for Americans, 2010  USDA's MyPlate  ASA Prophylaxis  Lung CA Screening    Karon Odell MD  [facility]  "

## 2019-09-20 ASSESSMENT — ANXIETY QUESTIONNAIRES: GAD7 TOTAL SCORE: 1

## 2019-09-23 LAB — HBA1C MFR BLD: 5.7 % (ref 0–5.6)

## 2019-09-25 ENCOUNTER — HOSPITAL ENCOUNTER (OUTPATIENT)
Dept: CARDIOLOGY | Facility: CLINIC | Age: 39
Discharge: HOME | End: 2019-09-25
Attending: FAMILY MEDICINE | Admitting: FAMILY MEDICINE
Payer: COMMERCIAL

## 2019-09-25 DIAGNOSIS — R01.1 UNDIAGNOSED CARDIAC MURMURS: ICD-10-CM

## 2019-09-25 PROCEDURE — 93306 TTE W/DOPPLER COMPLETE: CPT | Mod: 26 | Performed by: INTERNAL MEDICINE

## 2019-09-25 PROCEDURE — 40000264 ECHOCARDIOGRAM COMPLETE

## 2019-09-25 PROCEDURE — 25500064 ZZH RX 255 OP 636: Performed by: FAMILY MEDICINE

## 2019-09-25 RX ADMIN — HUMAN ALBUMIN MICROSPHERES AND PERFLUTREN 2 ML: 10; .22 INJECTION, SOLUTION INTRAVENOUS at 13:45

## 2019-09-27 ENCOUNTER — MYC MEDICAL ADVICE (OUTPATIENT)
Dept: FAMILY MEDICINE | Facility: CLINIC | Age: 39
End: 2019-09-27

## 2019-10-29 ENCOUNTER — OFFICE VISIT (OUTPATIENT)
Dept: SLEEP MEDICINE | Facility: CLINIC | Age: 39
End: 2019-10-29
Attending: FAMILY MEDICINE
Payer: COMMERCIAL

## 2019-10-29 VITALS
WEIGHT: 182.4 LBS | SYSTOLIC BLOOD PRESSURE: 118 MMHG | DIASTOLIC BLOOD PRESSURE: 78 MMHG | HEIGHT: 61 IN | OXYGEN SATURATION: 96 % | BODY MASS INDEX: 34.44 KG/M2 | HEART RATE: 101 BPM

## 2019-10-29 DIAGNOSIS — G47.33 OSA (OBSTRUCTIVE SLEEP APNEA): ICD-10-CM

## 2019-10-29 DIAGNOSIS — R06.81 APNEA: ICD-10-CM

## 2019-10-29 DIAGNOSIS — E66.01 MORBID OBESITY (H): ICD-10-CM

## 2019-10-29 DIAGNOSIS — R53.82 CHRONIC FATIGUE: ICD-10-CM

## 2019-10-29 DIAGNOSIS — R06.83 SNORING: Primary | ICD-10-CM

## 2019-10-29 PROCEDURE — 99205 OFFICE O/P NEW HI 60 MIN: CPT | Performed by: FAMILY MEDICINE

## 2019-10-29 ASSESSMENT — MIFFLIN-ST. JEOR: SCORE: 1451.96

## 2019-10-29 NOTE — PROGRESS NOTES
"Sleep Consultation:    Date on this visit: 10/29/2019    Yolanda Barnes is sent by Karon Rehman for a sleep consultation regarding snoring, observed apnea.    Primary Physician: Karon Rehman     HPI:  Ms. Yolanda Barnes is a 39 y/o female with history of hypertension, pre-diabetes, depression, and SUSANNAH who presents in clinic today with concerns regarding \"excessive snoring and pauses in breathing while sleeping.\" She reports first beginning to feel more tired throughout the day a couple of years ago. About one year ago, she stayed in a hotel with her sister for the weekend, and her sister observed several episodes of apnea throughout the night. Ms. Barnes reports her snoring has become so loud that her  now sleeps in a different room. She believes her snoring might wake her up at times, but she does not recall ever waking up gasping for air. She typically goes to bed around 9 PM during the week and wakes up around 5:45 AM to an alarm. On the weekends, she usually goes to bed around 10 PM and wakes up around 7 AM. She has no trouble initially falling asleep, but reports waking up usually around 8 times each night. She is typically able to fall back to sleep quickly, within 2-3 minutes. Upon waking up, she often feels as though her tongue is very dry and \"about to crack\", so she will grab a glass of water, which seems to help. She reports morning headaches 2-3 times a week, for which she often takes Tylenol or Ibuprofen.   Ms. Barnes also endorses significant fatigue throughout the day. She sometimes feels rested upon waking up in the morning, but other days has a lot of trouble getting out of bed. As the day goes on, she believes her fatigue worsens. She dozes off at home while watching television, and has dozed off at work a couple of times while sitting through long meetings. About one month ago, she felt drowsy while driving everyday for about a month, and she describes opening the " windows to allow in the cold air to help her stay awake. She denies feeling sleepy while driving since then. She has not been exercising because she does not have the energy to do so.  In terms of family history, Ms. Barnes's mother was diagnosed with sleep apnea and is treated with CPAP. She denies any family history of restless leg syndrome, but occasionally feels as though her legs are restless or aching at night. She does not believe this has any impact on her sleep at this time.     Allergies:    No Known Allergies    Medications:    Current Outpatient Medications   Medication Sig Dispense Refill     escitalopram (LEXAPRO) 10 MG tablet Take 1 tablet (10 mg) by mouth daily 90 tablet 3     hydrochlorothiazide (HYDRODIURIL) 25 MG tablet Take 1 tablet (25 mg) by mouth daily 90 tablet 3     levonorgestrel-ethinyl estradiol (SEASONALE) 0.15-0.03 MG tablet Take 1 tablet by mouth daily 91 tablet 3     lisinopril (PRINIVIL/ZESTRIL) 5 MG tablet Take 1 tablet (5 mg) by mouth daily 30 tablet 3       Problem List:  Patient Active Problem List    Diagnosis Date Noted     Obesity (BMI 35.0-39.9) with comorbidity (H) 09/19/2019     Priority: Medium     SUSANNAH (generalized anxiety disorder) 08/02/2018     Priority: Medium     Recurrent major depressive disorder, in remission (H) 08/02/2018     Priority: Medium     Benign essential hypertension 08/02/2018     Priority: Medium     ASCUS favor benign 08/12/2014     Priority: Medium     8/12/14: Pap- ASCUS, Neg HPV. Plan cotest in 3 years.       Acne 03/04/2013     Priority: Medium     CARDIOVASCULAR SCREENING; LDL GOAL LESS THAN 160 10/31/2010     Priority: Medium        Past Medical/Surgical History:  Past Medical History:   Diagnosis Date     Anxiety      ASCUS favor benign 8/2014    Neg HPV - cotest in 3 yrs     Chickenpox      Dysplasia of cervix, unspecified age 17    [facility]     Human papillomavirus in conditions classified elsewhere and  of unspecified site     age 18     Irritable bowel syndrome      Major depressive disorder, recurrent episode, moderate (H)     major depressive affective disorder, recurrent episodesDr. MER Loera, psychiatrist.      Past Surgical History:   Procedure Laterality Date      SECTION  2013    Procedure:  SECTION;   section;  Surgeon: Nimco Powell MD;  Location: [facility]      SECTION, TUBAL LIGATION, COMBINED N/A 2015    Procedure: COMBINED  SECTION, TUBAL LIGATION;  Surgeon: Karon Beckett MD;  Location: [facility]     MOUTH SURGERY      wisdom teeth       Social History:  Social History     Socioeconomic History     Marital status:      Spouse name: Not on file     Number of children: Not on file     Years of education: Not on file     Highest education level: Not on file   Occupational History     Not on file   Social Needs     Financial resource strain: Not on file     Food insecurity:     Worry: Not on file     Inability: Not on file     Transportation needs:     Medical: Not on file     Non-medical: Not on file   Tobacco Use     Smoking status: Never Smoker     Smokeless tobacco: Never Used   Substance and Sexual Activity     Alcohol use: No     Comment: rare- quit with pregnancy     Drug use: No     Sexual activity: Never     Partners: Male     Birth control/protection: Surgical     Comment: tubal   Lifestyle     Physical activity:     Days per week: Not on file     Minutes per session: Not on file     Stress: Not on file   Relationships     Social connections:     Talks on phone: Not on file     Gets together: Not on file     Attends Rastafarian service: Not on file     Active member of club or organization: Not on file     Attends meetings of clubs or organizations: Not on file     Relationship status: Not on file     Intimate partner violence:     Fear of current or ex partner: Not on file     Emotionally abused: Not on file      "Physically abused: Not on file     Forced sexual activity: Not on file   Other Topics Concern     Parent/sibling w/ CABG, MI or angioplasty before 65F 55M? No   Social History Narrative    ** Merged History Encounter **            Family History:  Family History   Problem Relation Age of Onset     Depression Mother      Alcohol/Drug Mother         drugs     Anxiety Disorder Mother      Diabetes Father      Depression Father      Eye Disorder Father      Alcohol/Drug Father         drugs     Alzheimer Disease Maternal Grandfather      Heart Disease Paternal Grandmother      Cerebrovascular Disease Paternal Grandfather      Eye Disorder Sister         corrected with lasik        Review of Systems:  A complete review of systems reviewed by me is negative with the exeption of what has been mentioned in the history of present illness.  CONSTITUTIONAL: NEGATIVE for weight gain/loss, fever, chills, sweats or night sweats, drug allergies.  EYES: NEGATIVE for changes in vision, blind spots, double vision.  ENT: NEGATIVE for ear pain, sore throat, sinus pain, post-nasal drip, runny nose, bloody nose  CARDIAC:  POSITIVE for  htn  NEUROLOGIC:  POSITIVE for  headaches  DERMATOLOGIC: NEGATIVE for rashes, new moles or change in mole(s)  PULMONARY:  POSITIVE for  SOB with activity  GASTROINTESTINAL: NEGATIVE for nausea or vomitting, loose or watery stools, fat or grease in stools, constipation, abdominal pain, bowel movements black in color or blood noted.  GENITOURINARY: NEGATIVE for pain during urination, blood in urine, urinating more frequently than usual, irregular menstrual periods.  MUSCULOSKELETAL: NEGATIVE for muscle pain, bone or joint pain, swollen joints.  ENDOCRINE: NEGATIVE for increased thirst or urination, diabetes.  LYMPHATIC: NEGATIVE for swollen lymph nodes, lumps or bumps in the breasts or nipple discharge.    Physical Examination:  Vitals: /78   Pulse 101   Ht 1.553 m (5' 1.14\")   Wt 82.7 kg (182 lb " 6.4 oz)   SpO2 96%   BMI 34.31 kg/m    BMI= Body mass index is 34.31 kg/m .    Neck Cir (cm): 39 cm    [place] Total Score 10/29/2019   Total score - [place] 14            GENERAL APPEARANCE: healthy, alert, no distress and over weight  RESP: lungs clear to auscultation - no rales, rhonchi or wheezes  CV: regular rates and rhythm, normal S1 S2, no S3 or S4 and no murmur, click or rub  PSYCH: mentation appears normal and affect normal/bright    Impression/Plan:    1. High probability of obstructive sleep apnea, based on STOP-BANG score of 4  We discussed with Ms. Barnes that her symptoms of excessive snoring, witnessed episodes of apnea, daytime fatigue, and hypertension, along with her family history of sleep apnea, suggest that further workup for sleep apnea is indicated. We recommended a home sleep test for further evaluation.     Over 50% of our time was spent in coordination and counseling of care for obstructive sleep apnea (GEOVANI).  We reviewed pathophysiology of GEOVANI, prior sleep testing if performed, indication for sleep testing, importance of treatment of significant GEOVANI to reduce long-term cardiovascular risk factors, importance of weight management, and proper care of equipment.    Scribe Disclosure:   Isable CASTILLO, MS4, am serving as a scribe; to document services personally performed by Dr. Rodolfo Zuleta, based on data collection and the provider's statements to me.     Provider Disclosure:  Rodolfo CASTILLO, agree with above History, Review of Systems, Physical exam and Plan.  I have reviewed the content of the documentation and have edited it as needed. I have personally performed the services documented here and the documentation accurately represents those services and the decisions I have made.      I have spent 60 minutes with this patient today in which greater than 50% of this time was spent in the counseling / coordination of care.      Rodolfo Zuleta MD      CC: Karon Dumont  [place]

## 2019-10-29 NOTE — NURSING NOTE
"Chief Complaint   Patient presents with     Consult For     Consult per Dr. Rehman RE: snoring,fatigue       Initial /78   Pulse 101   Ht 1.553 m (5' 1.14\")   Wt 82.7 kg (182 lb 6.4 oz)   SpO2 96%   BMI 34.31 kg/m   Estimated body mass index is 34.31 kg/m  as calculated from the following:    Height as of this encounter: 1.553 m (5' 1.14\").    Weight as of this encounter: 82.7 kg (182 lb 6.4 oz).    Medication Reconciliation: complete    Neck circumference: 15.5 inches / 39 centimeters.    DME: none  "

## 2019-10-29 NOTE — PROGRESS NOTES
"Date of clinic visit: 10/29/2019    HPI:  Ms. Yolanda Barnes is a 39 y/o female with history of hypertension, pre-diabetes, depression, and SUSANNAH who presents in clinic today with concerns regarding \"excessive snoring and pauses in breathing while sleeping.\" She reports first beginning to feel more tired throughout the day a couple of years ago. About one year ago, she stayed in a hotel with her sister for the weekend, and her sister observed several episodes of apnea throughout the night. Ms. Barnes reports her snoring has become so loud that her  now sleeps in a different room. She believes her snoring might wake her up at times, but she does not recall ever waking up gasping for air. She typically goes to bed around 9 PM during the week and wakes up around 5:45 AM to an alarm. On the weekends, she usually goes to bed around 10 PM and wakes up around 7 AM. She has no trouble initially falling asleep, but reports waking up usually around 8 times each night. She is typically able to fall back to sleep quickly, within 2-3 minutes. Upon waking up, she often feels as though her tongue is very dry and \"about to crack\", so she will grab a glass of water, which seems to help. She reports morning headaches 2-3 times a week, for which she often takes Tylenol or Ibuprofen.   Ms. Barnes also endorses significant fatigue throughout the day. She sometimes feels rested upon waking up in the morning, but other days has a lot of trouble getting out of bed. As the day goes on, she believes her fatigue worsens. She dozes off at home while watching television, and has dozed off at work a couple of times while sitting through long meetings. About one month ago, she felt drowsy while driving everyday for about a month, and she describes opening the windows to allow in the cold air to help her stay awake. She denies feeling sleepy while driving since then. She has not been exercising because she does not have the " energy to do so.  In terms of family history, Ms. Barnes's mother was diagnosed with sleep apnea and is treated with CPAP. She denies any family history of restless leg syndrome, but occasionally feels as though her legs are restless or aching at night. She does not believe this has any impact on her sleep at this time.     Impression/Plan:  1. High probability of obstructive sleep apnea, based on STOP-BANG score of 4  We discussed with Ms. Barnes that her symptoms of excessive snoring, witnessed episodes of apnea, daytime fatigue, and hypertension, along with her family history of sleep apnea, suggest that further workup for sleep apnea is indicated. We recommended a home sleep test for further evaluation.

## 2019-10-29 NOTE — PATIENT INSTRUCTIONS
Your BMI is Body mass index is 34.31 kg/m .  Weight management is a personal decision.  If you are interested in exploring weight loss strategies, the following discussion covers the approaches that may be successful. Body mass index (BMI) is one way to tell whether you are at a healthy weight, overweight, or obese. It measures your weight in relation to your height.  A BMI of 18.5 to 24.9 is in the healthy range. A person with a BMI of 25 to 29.9 is considered overweight, and someone with a BMI of 30 or greater is considered obese. More than two-thirds of American adults are considered overweight or obese.  Being overweight or obese increases the risk for further weight gain. Excess weight may lead to heart disease and diabetes.  Creating and following plans for healthy eating and physical activity may help you improve your health.  Weight control is part of healthy lifestyle and includes exercise, emotional health, and healthy eating habits. Careful eating habits lifelong are the mainstay of weight control. Though there are significant health benefits from weight loss, long-term weight loss with diet alone may be very difficult to achieve- studies show long-term success with dietary management in less than 10% of people. Attaining a healthy weight may be especially difficult to achieve in those with severe obesity. In some cases, medications, devices and surgical management might be considered.  What can you do?  If you are overweight or obese and are interested in methods for weight loss, you should discuss this with your provider.     Consider reducing daily calorie intake by 500 calories.     Keep a food journal.     Avoiding skipping meals, consider cutting portions instead.    Diet combined with exercise helps maintain muscle while optimizing fat loss. Strength training is particularly important for building and maintaining muscle mass. Exercise helps reduce stress, increase energy, and improves fitness.  Increasing exercise without diet control, however, may not burn enough calories to loose weight.       Start walking three days a week 10-20 minutes at a time    Work towards walking thirty minutes five days a week     Eventually, increase the speed of your walking for 1-2 minutes at time    In addition, we recommend that you review healthy lifestyles and methods for weight loss available through the National Institutes of Health patient information sites:  http://win.niddk.nih.gov/publications/index.htm    And look into health and wellness programs that may be available through your health insurance provider, employer, local community center, or jerri club.    Weight management plan: Patient was referred to their PCP to discuss a diet and exercise plan.

## 2019-11-03 ENCOUNTER — HEALTH MAINTENANCE LETTER (OUTPATIENT)
Age: 39
End: 2019-11-03

## 2019-12-02 ENCOUNTER — OFFICE VISIT (OUTPATIENT)
Dept: SLEEP MEDICINE | Facility: CLINIC | Age: 39
End: 2019-12-02
Payer: COMMERCIAL

## 2019-12-02 DIAGNOSIS — R06.81 APNEA: ICD-10-CM

## 2019-12-02 DIAGNOSIS — R06.83 SNORING: ICD-10-CM

## 2019-12-02 DIAGNOSIS — R53.82 CHRONIC FATIGUE: ICD-10-CM

## 2019-12-02 DIAGNOSIS — E66.01 MORBID OBESITY (H): ICD-10-CM

## 2019-12-02 DIAGNOSIS — G47.33 OSA (OBSTRUCTIVE SLEEP APNEA): ICD-10-CM

## 2019-12-02 PROCEDURE — G0399 HOME SLEEP TEST/TYPE 3 PORTA: HCPCS

## 2019-12-02 NOTE — Clinical Note
I sent the patient results by [employer] and put in orders for a CPAP set-up.  Quite severe GEOVANI on HST.

## 2019-12-02 NOTE — Clinical Note
URGENT HST ready for interp. AHI 94.2. For some reason, scored events do not show during review. Study was been auto scored by application.

## 2019-12-03 ENCOUNTER — DOCUMENTATION ONLY (OUTPATIENT)
Dept: SLEEP MEDICINE | Facility: CLINIC | Age: 39
End: 2019-12-03
Payer: COMMERCIAL

## 2019-12-03 NOTE — PROGRESS NOTES
This HSAT was performed using a Noxturnal T3 device which recorded snore, sound, movement activity, body position, nasal pressure, oronasal thermal airflow, pulse, oximetry and both chest and abdominal respiratory effort. HSAT data was restricted to the time patient states they were in bed.     HSAT was scored using 1B 4% hypopnea rule.     HST AHI (Non-PAT): 94.2  Snoring was reported as loud.  Time with SpO2 below 89% was 173 minutes.   Overall signal quality was fair     Pt will follow up with sleep provider to determine appropriate therapy.

## 2019-12-05 NOTE — PROCEDURES
"HOME SLEEP STUDY INTERPRETATION    Patient: Yolanda Barnes  MRN: [id number]  YOB: 1980  Study Date: 2019  Referring Provider: Karon Rehman  Ordering Provider: Rodolfo Zuleta MD     Indications for Home Study: Yolanda Barnes is a 39 year old female with a history of HTN, pre-diabetes, depression, SUSANNAH who presents with symptoms suggestive of obstructive sleep apnea.    Estimated body mass index is 34.31 kg/m  as calculated from the following:    Height as of 10/29/19: 1.553 m (5' 1.14\").    Weight as of 10/29/19: 82.7 kg (182 lb 6.4 oz).  Total score - [place]: 14 (10/29/2019 10:00 AM)  STOP-BAN/8    Data: A full night home sleep study was performed recording the standard physiologic parameters including body position, movement, sound, nasal pressure, thermal oral airflow, chest and abdominal movements with respiratory inductance plethysmography, and oxygen saturation by pulse oximetry. Pulse rate was estimated by oximetry recording. This study was considered adequate based on > 4 hours of quality oximetry and respiratory recording. As specified by the AASM Manual for the Scoring of Sleep and Associated events, version 2.3, Rule VIII.D 1B, 4% oxygen desaturation scoring for hypopneas is used as a standard of care on all home sleep apnea testing.    Analysis Time:  575.9 minutes    Respiration:   Sleep Associated Hypoxemia: sustained hypoxemia was not present. Baseline oxygen saturation was 93.8%.  Time with saturation less than or equal to 88% was 173 minutes. The lowest oxygen saturation was 73%.   Snoring: Snoring was present.  Respiratory events: The home study revealed a presence of 560 obstructive apneas and 0 mixed and central apneas. There were 344 hypopneas resulting in a combined apnea/hypopnea index [AHI] of 94.2 events per hour.  AHI was 95.6 per hour supine, - per hour prone, 0 per hour on left side, and 93.4 per hour on right side.   Pattern: Excluding events " noted above, respiratory rate and pattern was Normal.    Position: Percent of time spent: supine - 61.2%, prone - 0%, on left - 0.6%, on right - 37.8%.    Heart Rate: By pulse oximetry normal rate was noted.     Assessment:   Severe obstructive sleep apnea.  Sleep associated hypoxemia was present.    Recommendations:  Consider auto-CPAP at 5-15 cmH2O or polysomnography with full night PAP titration.  Suggest optimizing sleep hygiene and avoiding sleep deprivation.  Weight management.    Diagnosis Code(s): Obstructive Sleep Apnea G47.33, Hypoxemia G47.36    Rodolfo Zuleta MD, MD, December 5, 2019   Diplomate, American Board of Family Medicine, Sleep Medicine

## 2019-12-06 ENCOUNTER — DOCUMENTATION ONLY (OUTPATIENT)
Dept: SLEEP MEDICINE | Facility: CLINIC | Age: 39
End: 2019-12-06
Payer: COMMERCIAL

## 2019-12-06 NOTE — PROGRESS NOTES
Patient was offered choice of vendor and chose [facility].  Patient Yolanda Barnes was set up at [facility]  on December 6, 2019. Patient received a Resmed AirSense 10 Auto. Pressures were set at 5-15 cm H2O.   Patient s ramp is off cm H2O for Off and FLEX/EPR is 2.  Patient received a Resmed Mask name: [id number]  Nasal mask size Small, heated tubing and heated humidifier.  Patient is enrolled in the STM Program and does not need to meet compliance.   Riddhi Bella

## 2019-12-09 ENCOUNTER — DOCUMENTATION ONLY (OUTPATIENT)
Dept: SLEEP MEDICINE | Facility: CLINIC | Age: 39
End: 2019-12-09
Payer: COMMERCIAL

## 2019-12-20 ENCOUNTER — DOCUMENTATION ONLY (OUTPATIENT)
Dept: SLEEP MEDICINE | Facility: CLINIC | Age: 39
End: 2019-12-20
Payer: COMMERCIAL

## 2019-12-20 NOTE — PROGRESS NOTES
14  DAY STM VISIT    Diagnostic AHI:  94.2 HST    Subjective measures:   Patient still feeling great  she had some questions about getting supplies.    Assessment: Pt meeting objective benchmarks.  Patient meeting subjective benchmarks.     Action plan: pt to have 30 day STM visit.      Device type: Auto-CPAP    PAP settings: CPAP min 5.0 cm  H20       CPAP max 15.0 cm  H20    95th% pressure 14 cm  H20     Mask type:  Nasal Mask    Objective measures: 14 day rolling measures      Compliance  100 %      Leak  1.38 lpm  last  upload      AHI 1.1   last  upload      Average number of minutes 530      Objective measure goal  Compliance   Goal >70%  Leak   Goal < 24 lpm  AHI  Goal < 5  Usage  Goal >240      Total time spent on accessing, reviewing and interpreting remote patient PAP therapy data:   10 minutes      Total time spent with direct patient communication :   4 minutes

## 2020-01-06 ENCOUNTER — DOCUMENTATION ONLY (OUTPATIENT)
Dept: SLEEP MEDICINE | Facility: CLINIC | Age: 40
End: 2020-01-06
Payer: COMMERCIAL

## 2020-01-06 NOTE — PROGRESS NOTES
30 DAY STM VISIT    Diagnostic AHI:  94.2 HST    Data only recheck     Assessment: Pt meeting objective benchmarks.     Action plan: pt to have 6 month STM visit  Patient has not scheduled a follow up visit.   Device type: Auto-CPAP  PAP settings: CPAP min 5.0 cm  H20     CPAP max 15.0 cm  H20    95th% pressure 14.2 cm  H20   Mask type:  Nasal Mask  Objective measures: 14 day rolling measures      Compliance  100 %      Leak  2.72 lpm  last  upload      AHI 0.88   last  upload      Average number of minutes 533      Objective measure goal  Compliance   Goal >70%  Leak   Goal < 24 lpm  AHI  Goal < 5  Usage  Goal >240         Total time spent on accessing, reviewing and interpreting remote patient PAP therapy data:   9 minutes      Total time spent with direct patient communication :   0 minutes    Total time spent on  remote patient monitoring analysis for last 30 days:   42 min

## 2020-01-08 ENCOUNTER — MYC MEDICAL ADVICE (OUTPATIENT)
Dept: FAMILY MEDICINE | Facility: CLINIC | Age: 40
End: 2020-01-08

## 2020-01-09 PROBLEM — E66.01 MORBID OBESITY (H): Chronic | Status: ACTIVE | Noted: 2019-09-19

## 2020-01-09 PROBLEM — F33.40 RECURRENT MAJOR DEPRESSIVE DISORDER, IN REMISSION (H): Chronic | Status: ACTIVE | Noted: 2018-08-02

## 2020-01-09 PROBLEM — F41.1 GAD (GENERALIZED ANXIETY DISORDER): Status: ACTIVE | Noted: 2018-08-02

## 2020-01-09 PROBLEM — F33.40 RECURRENT MAJOR DEPRESSIVE DISORDER, IN REMISSION (H): Status: ACTIVE | Noted: 2018-08-02

## 2020-01-09 PROBLEM — G47.33 OSA (OBSTRUCTIVE SLEEP APNEA): Status: ACTIVE | Noted: 2019-12-05

## 2020-01-09 PROBLEM — I10 BENIGN ESSENTIAL HYPERTENSION: Status: ACTIVE | Noted: 2018-08-02

## 2020-01-09 PROBLEM — F41.1 GAD (GENERALIZED ANXIETY DISORDER): Chronic | Status: ACTIVE | Noted: 2018-08-02

## 2020-01-09 PROBLEM — G47.33 OSA (OBSTRUCTIVE SLEEP APNEA): Chronic | Status: ACTIVE | Noted: 2019-12-05

## 2020-01-09 PROBLEM — I10 BENIGN ESSENTIAL HYPERTENSION: Chronic | Status: ACTIVE | Noted: 2018-08-02

## 2020-01-10 ENCOUNTER — OFFICE VISIT (OUTPATIENT)
Dept: FAMILY MEDICINE | Facility: CLINIC | Age: 40
End: 2020-01-10
Payer: COMMERCIAL

## 2020-01-10 VITALS
TEMPERATURE: 99.3 F | OXYGEN SATURATION: 97 % | BODY MASS INDEX: 35.5 KG/M2 | RESPIRATION RATE: 16 BRPM | SYSTOLIC BLOOD PRESSURE: 124 MMHG | HEART RATE: 84 BPM | HEIGHT: 61 IN | DIASTOLIC BLOOD PRESSURE: 74 MMHG | WEIGHT: 188 LBS

## 2020-01-10 DIAGNOSIS — N94.6 DYSMENORRHEA: Primary | ICD-10-CM

## 2020-01-10 DIAGNOSIS — N93.8 DUB (DYSFUNCTIONAL UTERINE BLEEDING): ICD-10-CM

## 2020-01-10 LAB
ERYTHROCYTE [DISTWIDTH] IN BLOOD BY AUTOMATED COUNT: 12.9 % (ref 10–15)
HCT VFR BLD AUTO: 37.3 % (ref 35–47)
HGB BLD-MCNC: 12.4 G/DL (ref 11.7–15.7)
MCH RBC QN AUTO: 29.7 PG (ref 26.5–33)
MCHC RBC AUTO-ENTMCNC: 33.2 G/DL (ref 31.5–36.5)
MCV RBC AUTO: 89 FL (ref 78–100)
PLATELET # BLD AUTO: 265 10E9/L (ref 150–450)
RBC # BLD AUTO: 4.18 10E12/L (ref 3.8–5.2)
TSH SERPL DL<=0.005 MIU/L-ACNC: 0.99 MU/L (ref 0.4–4)
WBC # BLD AUTO: 6 10E9/L (ref 4–11)

## 2020-01-10 PROCEDURE — 85027 COMPLETE CBC AUTOMATED: CPT | Performed by: NURSE PRACTITIONER

## 2020-01-10 PROCEDURE — 99214 OFFICE O/P EST MOD 30 MIN: CPT | Performed by: NURSE PRACTITIONER

## 2020-01-10 PROCEDURE — 84443 ASSAY THYROID STIM HORMONE: CPT | Performed by: NURSE PRACTITIONER

## 2020-01-10 PROCEDURE — 36415 COLL VENOUS BLD VENIPUNCTURE: CPT | Performed by: NURSE PRACTITIONER

## 2020-01-10 RX ORDER — MEDROXYPROGESTERONE ACETATE 5 MG
5 TABLET ORAL DAILY
Qty: 7 TABLET | Refills: 0 | Status: SHIPPED | OUTPATIENT
Start: 2020-01-10 | End: 2020-01-17

## 2020-01-10 RX ORDER — MEDROXYPROGESTERONE ACETATE 10 MG
10 TABLET ORAL DAILY
Qty: 10 TABLET | Refills: 0 | Status: SHIPPED | OUTPATIENT
Start: 2020-01-10 | End: 2020-01-10

## 2020-01-10 ASSESSMENT — MIFFLIN-ST. JEOR: SCORE: 1470.14

## 2020-01-10 NOTE — NURSING NOTE
"Chief Complaint   Patient presents with     Abnormal Bleeding Problem       Initial /74   Pulse 84   Temp 99.3  F (37.4  C) (Tympanic)   Resp 16   Ht 1.549 m (5' 1\")   Wt 85.3 kg (188 lb)   LMP 11/08/2019   SpO2 97%   BMI 35.52 kg/m   Estimated body mass index is 35.52 kg/m  as calculated from the following:    Height as of this encounter: 1.549 m (5' 1\").    Weight as of this encounter: 85.3 kg (188 lb).    Patient presents to the clinic using No DME    Health Maintenance that is potentially due pending provider review:  NONE    n/a    Is there anyone who you would like to be able to receive your results? No  If yes have patient fill out WADE    "

## 2020-01-10 NOTE — PROGRESS NOTES
"    SUBJECTIVE   Yolanda Barnes is a  female who presents to clinic today for the following health issue(s):       Vaginal Bleeding (Dysmenorrhea)  Onset: 3 days    Description:   Duration of bleeding episodes: 3 days   Frequency between periods:  90 days in mid pack of seasonal   Describe bleeding/flow:   Clots: YES  Number of pads/hour:  changes pads every 20 minutes   Cramping: none    Accompanying Signs & Symptoms:  Weakness: no  Lightheadedness: no  Hot flashes: no  Nosebleeds/Easy bruising: no  Vaginal Discharge: no  Facial hair:  no  Weight gain, or overweight:  no  Bleeding after intercourse:  no  Family history of similar issue:  no    History:  Patient's last menstrual period was 11/08/2019.  Previous normal periods: YES  Contraceptive use: oral contraceptive   Possibility of Pregnancy: no (they haven't been sexually active for many months)  Any bleeding after intercourse: no  Age of first period (menarche): 14  Abnormal PAP Smears: YES- age 17   Concern for STDs\": no    Precipitating factors:   NA    Alleviating factors:  NA  Therapies Tried and outcome: None  OCPs since 2009 for metorhagia with periods  9/19/19- CBC and TSH normal    On monophasic Seasonale for 3 months at a time. She is not \"DUE\" for period for another month. Typically scant periods. Missed 1 pill a week apart last week. (In the past, if she's missed two, she would get only scant bleeding)    Lab Results   Component Value Date    PAP NIL 08/18/2017    PAP ASC-US 08/12/2014    PAP NIL 10/26/2012       Tests: CBC, UPT, vaginal ultrasound- (consider MRI if equivocal)  Health Maintenance        Health Maintenance Due   Topic Date Due     DEPRESSION ACTION PLAN  1981     INFLUENZA VACCINE (1) 09/01/2019       PCP   Karon Odell -[phone]    PROBLEM LIST        Patient Active Problem List   Diagnosis     CARDIOVASCULAR SCREENING; LDL GOAL LESS THAN 160     Acne     SUSANNAH (generalized anxiety disorder)     Recurrent major " "depressive disorder, in remission (H)     Benign essential hypertension     Obesity (BMI 35.0-39.9) with comorbidity (H)     GEOVANI (obstructive sleep apnea)       MEDICATIONS        Current Outpatient Medications   Medication     escitalopram (LEXAPRO) 10 MG tablet     hydrochlorothiazide (HYDRODIURIL) 25 MG tablet     levonorgestrel-ethinyl estradiol (SEASONALE) 0.15-0.03 MG tablet     lisinopril (PRINIVIL/ZESTRIL) 5 MG tablet     medroxyPROGESTERone (PROVERA) 5 MG tablet     No current facility-administered medications for this visit.        Reviewed and updated as needed this visit by Provider:  Tobacco  Allergies  Meds  Med Hx  Surg Hx  Fam Hx  Soc Hx     ROS      Constitutional, HEENT, cardiovascular, pulmonary, gi and gu systems are negative, except as otherwise noted.    PHYSICAL EXAM   /74   Pulse 84   Temp 99.3  F (37.4  C) (Tympanic)   Resp 16   Ht 1.549 m (5' 1\")   Wt 85.3 kg (188 lb)   LMP 11/08/2019   SpO2 97%   BMI 35.52 kg/m    Body mass index is 35.52 kg/m .  GENERAL APPEARANCE: healthy, alert and no distress  RESP: lungs clear to auscultation - no rales, rhonchi or wheezes  CV: regular rates and rhythm, normal S1 S2, no S3 or S4 and no murmur, click or rub  ABDOMEN: soft, nontender, without hepatosplenomegaly or masses and bowel sounds normal  MS: extremities normal- no gross deformities noted  SKIN: no suspicious lesions or rashes  PSYCH: mentation appears normal and affect normal/bright    ASSESSMENT & PLAN     1. Dysmenorrhea  Acute, stable  - CBC with platelets  - TSH with free T4 reflex    2. DUB (dysfunctional uterine bleeding)  Acute, stable  - CBC with platelets  - US Pelvic Complete with Transvaginal; Future  - medroxyPROGESTERone (PROVERA) 5 MG tablet; Take 1 tablet (5 mg) by mouth daily for 7 days  Dispense: 7 tablet; Refill: 0  Call Radiology department to schedule at ultrasound at [phone].  Mondays and Thursdays in [place]  - Ibuprofen 600 mg every 6 hours with " food  - If US is normal, we'll have you continue to monitor for further bouts, and follow-up with your PCP      Patient Education     Dysfunctional Uterine Bleeding    Dysfunctional uterine bleeding, also called abnormal uterine bleeding, is a condition in which bleeding is abnormal and occurs at unexpected times of the month. This happens because of changes in the hormones that help control a woman s menstrual cycle each month.  The bleeding may be heavier or lighter than normal. If you have heavy bleeding often, this can lead to a problem called anemia. With anemia, your red blood cell count is too low. Red blood cells help carry oxygen throughout your body. Severe anemia may cause you to look pale and feel very weak or tired. You might also become short of breath easily.  To treat dysfunctional uterine bleeding, medicines are often tried first. If these don t help, or if you have additional symptoms or have reached menopause, further testing and treatments may be needed. Discuss all of your options with your provider.  Home care  Medicines  If you re prescribed medicines, be sure to take them as directed. Some of the more common medicines you may be prescribed include:    Hormone therapy (Options include most methods of hormonal birth control such as pills, shots, or a hormone-releasing IUD)    Nonsteroidal anti-inflammatory drugs (NSAIDs), such as ibuprofen    Iron supplements, if you have anemia     General care    Get plenty of rest if you tire easily. Avoid heavy exertion.    To help relieve pain or cramping that may occur with bleeding, try using a heating pad on the lower belly or back. A warm bath may also help.  Follow-up care  Follow up with your healthcare provider, or as directed.  When to seek medical advice  Call your healthcare provider right away if:    Bleeding becomes heavy (soaking 1 pad or tampon every hour for 3 hours)    Increased abdominal pain    Irregular bleeding worsens or does not get  better even with treatment    Fever of 100.4 F (38 C) or higher, or as directed by your provider    Signs of anemia, such as pale skin, extreme fatigue or weakness, or shortness of breath    Dizziness or fainting   Date Last Reviewed: 11/1/2017 2000-2019 [employer]. [address] [place] [id number]. All rights reserved. This information is not intended as a substitute for professional medical care. Always follow your healthcare professional's instructions.             Risks, benefits, side effects and rationale for treatment plan fully discussed with the patient and understanding expressed.  ROGELIO Mercedes-BC  [facility]

## 2020-01-10 NOTE — PATIENT INSTRUCTIONS
1. Dysmenorrhea  Acute, stable  - CBC with platelets  - TSH with free T4 reflex    2. DUB (dysfunctional uterine bleeding)  Acute, stable  - CBC with platelets  - US Pelvic Complete with Transvaginal; Future  - medroxyPROGESTERone (PROVERA) 5 MG tablet; Take 1 tablet (5 mg) by mouth daily for 7 days  Dispense: 7 tablet; Refill: 0  Call Radiology department to schedule at ultrasound at [phone].  Mondays and Thursdays in [place]  - Ibuprofen 600 mg every 6 hours with food  - If US is normal, we'll have you continue to monitor for further bouts, and follow-up with your PCP      Patient Education     Dysfunctional Uterine Bleeding    Dysfunctional uterine bleeding, also called abnormal uterine bleeding, is a condition in which bleeding is abnormal and occurs at unexpected times of the month. This happens because of changes in the hormones that help control a woman s menstrual cycle each month.  The bleeding may be heavier or lighter than normal. If you have heavy bleeding often, this can lead to a problem called anemia. With anemia, your red blood cell count is too low. Red blood cells help carry oxygen throughout your body. Severe anemia may cause you to look pale and feel very weak or tired. You might also become short of breath easily.  To treat dysfunctional uterine bleeding, medicines are often tried first. If these don t help, or if you have additional symptoms or have reached menopause, further testing and treatments may be needed. Discuss all of your options with your provider.  Home care  Medicines  If you re prescribed medicines, be sure to take them as directed. Some of the more common medicines you may be prescribed include:    Hormone therapy (Options include most methods of hormonal birth control such as pills, shots, or a hormone-releasing IUD)    Nonsteroidal anti-inflammatory drugs (NSAIDs), such as ibuprofen    Iron supplements, if you have anemia     General care    Get plenty of rest if you  tire easily. Avoid heavy exertion.    To help relieve pain or cramping that may occur with bleeding, try using a heating pad on the lower belly or back. A warm bath may also help.  Follow-up care  Follow up with your healthcare provider, or as directed.  When to seek medical advice  Call your healthcare provider right away if:    Bleeding becomes heavy (soaking 1 pad or tampon every hour for 3 hours)    Increased abdominal pain    Irregular bleeding worsens or does not get better even with treatment    Fever of 100.4 F (38 C) or higher, or as directed by your provider    Signs of anemia, such as pale skin, extreme fatigue or weakness, or shortness of breath    Dizziness or fainting   Date Last Reviewed: 11/1/2017 2000-2019 [employer]. [address] [place] [address], [place] [address]. All rights reserved. This information is not intended as a substitute for professional medical care. Always follow your healthcare professional's instructions.

## 2020-01-10 NOTE — RESULT ENCOUNTER NOTE
Dear Yolanda,  TSH (thyroid) -normal  CBC (check for anemia)- normal  Please contact our clinic via phone or My Chart if you have any questions or concerns.  Thanks,  ROGELIO Murry

## 2020-01-13 ENCOUNTER — ANCILLARY PROCEDURE (OUTPATIENT)
Dept: ULTRASOUND IMAGING | Facility: CLINIC | Age: 40
End: 2020-01-13
Attending: NURSE PRACTITIONER
Payer: COMMERCIAL

## 2020-01-13 DIAGNOSIS — N93.8 DUB (DYSFUNCTIONAL UTERINE BLEEDING): ICD-10-CM

## 2020-01-13 PROCEDURE — 76830 TRANSVAGINAL US NON-OB: CPT

## 2020-01-13 PROCEDURE — 76856 US EXAM PELVIC COMPLETE: CPT

## 2020-01-13 NOTE — TELEPHONE ENCOUNTER
Spoke with pt, informed Apryl out of office today but message has been forwarded to her.  Pt has pelvic US scheduled today, lab appt Wed.  Pt OK to await Apryl's reply.  MER Bella RN

## 2020-01-14 NOTE — RESULT ENCOUNTER NOTE
Dear Yolanda,  Pelvic ultrasound is normal. Please follow-up with your Primary Care Provider Dr. Rehman as needed.   Thanks,  ROGELIO Murry

## 2020-01-15 DIAGNOSIS — I10 BENIGN ESSENTIAL HYPERTENSION: ICD-10-CM

## 2020-01-15 DIAGNOSIS — E78.5 HYPERLIPIDEMIA LDL GOAL <130: ICD-10-CM

## 2020-01-15 LAB
ANION GAP SERPL CALCULATED.3IONS-SCNC: 5 MMOL/L (ref 3–14)
BUN SERPL-MCNC: 18 MG/DL (ref 7–30)
CALCIUM SERPL-MCNC: 8.7 MG/DL (ref 8.5–10.1)
CHLORIDE SERPL-SCNC: 107 MMOL/L (ref 94–109)
CHOLEST SERPL-MCNC: 201 MG/DL
CO2 SERPL-SCNC: 28 MMOL/L (ref 20–32)
CREAT SERPL-MCNC: 0.7 MG/DL (ref 0.52–1.04)
GFR SERPL CREATININE-BSD FRML MDRD: >90 ML/MIN/{1.73_M2}
GLUCOSE SERPL-MCNC: 94 MG/DL (ref 70–99)
HDLC SERPL-MCNC: 43 MG/DL
LDLC SERPL CALC-MCNC: 126 MG/DL
NONHDLC SERPL-MCNC: 158 MG/DL
POTASSIUM SERPL-SCNC: 3.9 MMOL/L (ref 3.4–5.3)
SODIUM SERPL-SCNC: 140 MMOL/L (ref 133–144)
TRIGL SERPL-MCNC: 158 MG/DL

## 2020-01-15 PROCEDURE — 80048 BASIC METABOLIC PNL TOTAL CA: CPT | Performed by: FAMILY MEDICINE

## 2020-01-15 PROCEDURE — 36415 COLL VENOUS BLD VENIPUNCTURE: CPT | Performed by: FAMILY MEDICINE

## 2020-01-15 PROCEDURE — 80061 LIPID PANEL: CPT | Performed by: FAMILY MEDICINE

## 2020-07-13 ENCOUNTER — MYC REFILL (OUTPATIENT)
Dept: FAMILY MEDICINE | Facility: CLINIC | Age: 40
End: 2020-07-13

## 2020-07-13 DIAGNOSIS — I10 BENIGN ESSENTIAL HYPERTENSION: ICD-10-CM

## 2020-07-13 RX ORDER — LISINOPRIL 5 MG/1
5 TABLET ORAL DAILY
Qty: 90 TABLET | Refills: 1 | Status: SHIPPED | OUTPATIENT
Start: 2020-07-13 | End: 2020-07-22

## 2020-07-21 DIAGNOSIS — I10 BENIGN ESSENTIAL HYPERTENSION: ICD-10-CM

## 2020-07-22 RX ORDER — LISINOPRIL 5 MG/1
5 TABLET ORAL DAILY
Qty: 90 TABLET | Refills: 1 | Status: SHIPPED | OUTPATIENT
Start: 2020-07-22 | End: 2021-04-08

## 2020-08-04 DIAGNOSIS — N92.0 MENORRHAGIA WITH REGULAR CYCLE: ICD-10-CM

## 2020-08-04 RX ORDER — LEVONORGESTREL AND ETHINYL ESTRADIOL 0.15-0.03
1 KIT ORAL DAILY
Qty: 91 TABLET | Refills: 0 | Status: SHIPPED | OUTPATIENT
Start: 2020-08-04 | End: 2020-11-02

## 2020-09-10 DIAGNOSIS — I10 BENIGN ESSENTIAL HYPERTENSION: ICD-10-CM

## 2020-09-10 RX ORDER — HYDROCHLOROTHIAZIDE 25 MG/1
TABLET ORAL
Qty: 90 TABLET | Refills: 3 | Status: SHIPPED | OUTPATIENT
Start: 2020-09-10 | End: 2021-04-08

## 2020-09-10 NOTE — TELEPHONE ENCOUNTER
"Routing refill request to provider for review/approval because:  Patient needs to be seen because it has been more than 1 year since last office visit.    Requested Prescriptions   Pending Prescriptions Disp Refills     hydrochlorothiazide (HYDRODIURIL) 25 MG tablet [Pharmacy Med Name: HYDROCHLOROTHIAZIDE 25MG TABS] 90 tablet 3     Sig: TAKE ONE TABLET BY MOUTH ONCE DAILY       Diuretics (Including Combos) Protocol Passed - 9/10/2020  5:01 AM        Passed - Blood pressure under 140/90 in past 12 months     BP Readings from Last 3 Encounters:   01/10/20 124/74   10/29/19 118/78   09/19/19 (!) 142/94                 Passed - Recent (12 mo) or future (30 days) visit within the authorizing provider's specialty     Patient has had an office visit with the authorizing provider or a provider within the authorizing providers department within the previous 12 mos or has a future within next 30 days. See \"Patient Info\" tab in inbasket, or \"Choose Columns\" in Meds & Orders section of the refill encounter.              Passed - Medication is active on med list        Passed - Patient is age 18 or older        Passed - No active pregancy on record        Passed - Normal serum creatinine on file in past 12 months     Recent Labs   Lab Test 01/15/20  0740   CR 0.70              Passed - Normal serum potassium on file in past 12 months     Recent Labs   Lab Test 01/15/20  0740   POTASSIUM 3.9                    Passed - Normal serum sodium on file in past 12 months     Recent Labs   Lab Test 01/15/20  0740                 Passed - No positive pregnancy test in past 12 months           Guillermina BELL RN, BSN        "

## 2020-10-14 DIAGNOSIS — F41.1 GAD (GENERALIZED ANXIETY DISORDER): ICD-10-CM

## 2020-10-14 RX ORDER — ESCITALOPRAM OXALATE 10 MG/1
TABLET ORAL
Qty: 90 TABLET | Refills: 0 | Status: SHIPPED | OUTPATIENT
Start: 2020-10-14 | End: 2020-11-18

## 2020-11-02 DIAGNOSIS — N92.0 MENORRHAGIA WITH REGULAR CYCLE: ICD-10-CM

## 2020-11-02 RX ORDER — LEVONORGESTREL AND ETHINYL ESTRADIOL 0.15-0.03
KIT ORAL
Qty: 91 TABLET | Refills: 0 | Status: SHIPPED | OUTPATIENT
Start: 2020-11-02 | End: 2020-11-03

## 2020-11-03 DIAGNOSIS — N92.0 MENORRHAGIA WITH REGULAR CYCLE: ICD-10-CM

## 2020-11-03 RX ORDER — LEVONORGESTREL AND ETHINYL ESTRADIOL 0.15-0.03
KIT ORAL
Qty: 91 TABLET | Refills: 1 | Status: SHIPPED | OUTPATIENT
Start: 2020-11-03 | End: 2021-02-02

## 2020-11-16 ENCOUNTER — HEALTH MAINTENANCE LETTER (OUTPATIENT)
Age: 40
End: 2020-11-16

## 2020-11-18 ENCOUNTER — MYC REFILL (OUTPATIENT)
Dept: FAMILY MEDICINE | Facility: CLINIC | Age: 40
End: 2020-11-18

## 2020-11-18 DIAGNOSIS — F41.1 GAD (GENERALIZED ANXIETY DISORDER): ICD-10-CM

## 2020-11-18 RX ORDER — ESCITALOPRAM OXALATE 10 MG/1
10 TABLET ORAL DAILY
Qty: 90 TABLET | Refills: 0 | Status: SHIPPED | OUTPATIENT
Start: 2020-11-18 | End: 2021-02-10

## 2021-01-06 DIAGNOSIS — G47.33 OSA (OBSTRUCTIVE SLEEP APNEA): Primary | ICD-10-CM

## 2021-01-29 DIAGNOSIS — N92.0 MENORRHAGIA WITH REGULAR CYCLE: ICD-10-CM

## 2021-02-02 RX ORDER — LEVONORGESTREL AND ETHINYL ESTRADIOL 0.15-0.03
KIT ORAL
Qty: 31 TABLET | Refills: 0 | Status: SHIPPED | OUTPATIENT
Start: 2021-02-02 | End: 2021-04-08

## 2021-02-09 DIAGNOSIS — F41.1 GAD (GENERALIZED ANXIETY DISORDER): ICD-10-CM

## 2021-02-10 RX ORDER — ESCITALOPRAM OXALATE 10 MG/1
10 TABLET ORAL DAILY
Qty: 30 TABLET | Refills: 0 | Status: SHIPPED | OUTPATIENT
Start: 2021-02-10 | End: 2021-03-11

## 2021-03-10 DIAGNOSIS — F41.1 GAD (GENERALIZED ANXIETY DISORDER): ICD-10-CM

## 2021-03-11 RX ORDER — ESCITALOPRAM OXALATE 10 MG/1
TABLET ORAL
Qty: 30 TABLET | Refills: 0 | Status: SHIPPED | OUTPATIENT
Start: 2021-03-11 | End: 2021-04-06

## 2021-04-05 DIAGNOSIS — F41.1 GAD (GENERALIZED ANXIETY DISORDER): ICD-10-CM

## 2021-04-06 RX ORDER — ESCITALOPRAM OXALATE 10 MG/1
TABLET ORAL
Qty: 30 TABLET | Refills: 0 | Status: SHIPPED | OUTPATIENT
Start: 2021-04-06 | End: 2021-04-08

## 2021-04-08 ENCOUNTER — OFFICE VISIT (OUTPATIENT)
Dept: FAMILY MEDICINE | Facility: CLINIC | Age: 41
End: 2021-04-08
Payer: COMMERCIAL

## 2021-04-08 VITALS
HEIGHT: 61 IN | OXYGEN SATURATION: 99 % | SYSTOLIC BLOOD PRESSURE: 126 MMHG | DIASTOLIC BLOOD PRESSURE: 82 MMHG | BODY MASS INDEX: 37 KG/M2 | TEMPERATURE: 98 F | WEIGHT: 196 LBS | HEART RATE: 77 BPM

## 2021-04-08 DIAGNOSIS — I10 BENIGN ESSENTIAL HYPERTENSION: ICD-10-CM

## 2021-04-08 DIAGNOSIS — N92.0 MENORRHAGIA WITH REGULAR CYCLE: ICD-10-CM

## 2021-04-08 DIAGNOSIS — E66.01 MORBID OBESITY (H): ICD-10-CM

## 2021-04-08 DIAGNOSIS — Z00.00 ROUTINE GENERAL MEDICAL EXAMINATION AT A HEALTH CARE FACILITY: Primary | ICD-10-CM

## 2021-04-08 DIAGNOSIS — F33.40 RECURRENT MAJOR DEPRESSIVE DISORDER, IN REMISSION (H): Chronic | ICD-10-CM

## 2021-04-08 DIAGNOSIS — F41.1 GAD (GENERALIZED ANXIETY DISORDER): ICD-10-CM

## 2021-04-08 DIAGNOSIS — Z13.6 CARDIOVASCULAR SCREENING; LDL GOAL LESS THAN 160: ICD-10-CM

## 2021-04-08 LAB
ALBUMIN SERPL-MCNC: 3.5 G/DL (ref 3.4–5)
ALP SERPL-CCNC: 50 U/L (ref 40–150)
ALT SERPL W P-5'-P-CCNC: 39 U/L (ref 0–50)
ANION GAP SERPL CALCULATED.3IONS-SCNC: 6 MMOL/L (ref 3–14)
AST SERPL W P-5'-P-CCNC: 27 U/L (ref 0–45)
BASOPHILS # BLD AUTO: 0.1 10E9/L (ref 0–0.2)
BASOPHILS NFR BLD AUTO: 0.7 %
BILIRUB SERPL-MCNC: 0.2 MG/DL (ref 0.2–1.3)
BUN SERPL-MCNC: 14 MG/DL (ref 7–30)
CALCIUM SERPL-MCNC: 9.5 MG/DL (ref 8.5–10.1)
CHLORIDE SERPL-SCNC: 107 MMOL/L (ref 94–109)
CHOLEST SERPL-MCNC: 232 MG/DL
CO2 SERPL-SCNC: 26 MMOL/L (ref 20–32)
CREAT SERPL-MCNC: 0.8 MG/DL (ref 0.52–1.04)
DIFFERENTIAL METHOD BLD: ABNORMAL
EOSINOPHIL # BLD AUTO: 0.2 10E9/L (ref 0–0.7)
EOSINOPHIL NFR BLD AUTO: 2.3 %
ERYTHROCYTE [DISTWIDTH] IN BLOOD BY AUTOMATED COUNT: 22.1 % (ref 10–15)
GFR SERPL CREATININE-BSD FRML MDRD: >90 ML/MIN/{1.73_M2}
GLUCOSE SERPL-MCNC: 99 MG/DL (ref 70–99)
HCT VFR BLD AUTO: 37.5 % (ref 35–47)
HDLC SERPL-MCNC: 45 MG/DL
HGB BLD-MCNC: 11 G/DL (ref 11.7–15.7)
IMM GRANULOCYTES # BLD: 0 10E9/L (ref 0–0.4)
IMM GRANULOCYTES NFR BLD: 0.3 %
LDLC SERPL CALC-MCNC: 154 MG/DL
LYMPHOCYTES # BLD AUTO: 2 10E9/L (ref 0.8–5.3)
LYMPHOCYTES NFR BLD AUTO: 29.6 %
MCH RBC QN AUTO: 20.8 PG (ref 26.5–33)
MCHC RBC AUTO-ENTMCNC: 29.3 G/DL (ref 31.5–36.5)
MCV RBC AUTO: 71 FL (ref 78–100)
MONOCYTES # BLD AUTO: 0.6 10E9/L (ref 0–1.3)
MONOCYTES NFR BLD AUTO: 9.3 %
NEUTROPHILS # BLD AUTO: 4 10E9/L (ref 1.6–8.3)
NEUTROPHILS NFR BLD AUTO: 57.8 %
NONHDLC SERPL-MCNC: 187 MG/DL
NRBC # BLD AUTO: 0 10*3/UL
NRBC BLD AUTO-RTO: 0 /100
PLATELET # BLD AUTO: 390 10E9/L (ref 150–450)
POTASSIUM SERPL-SCNC: 4 MMOL/L (ref 3.4–5.3)
PROT SERPL-MCNC: 7.5 G/DL (ref 6.8–8.8)
RBC # BLD AUTO: 5.28 10E12/L (ref 3.8–5.2)
SODIUM SERPL-SCNC: 139 MMOL/L (ref 133–144)
TRIGL SERPL-MCNC: 163 MG/DL
WBC # BLD AUTO: 6.9 10E9/L (ref 4–11)

## 2021-04-08 PROCEDURE — 85025 COMPLETE CBC W/AUTO DIFF WBC: CPT | Performed by: FAMILY MEDICINE

## 2021-04-08 PROCEDURE — 99395 PREV VISIT EST AGE 18-39: CPT | Performed by: FAMILY MEDICINE

## 2021-04-08 PROCEDURE — 80061 LIPID PANEL: CPT | Performed by: FAMILY MEDICINE

## 2021-04-08 PROCEDURE — 36415 COLL VENOUS BLD VENIPUNCTURE: CPT | Performed by: FAMILY MEDICINE

## 2021-04-08 PROCEDURE — 99214 OFFICE O/P EST MOD 30 MIN: CPT | Mod: 25 | Performed by: FAMILY MEDICINE

## 2021-04-08 PROCEDURE — 80053 COMPREHEN METABOLIC PANEL: CPT | Performed by: FAMILY MEDICINE

## 2021-04-08 RX ORDER — LEVONORGESTREL AND ETHINYL ESTRADIOL 0.15-0.03
1 KIT ORAL DAILY
Qty: 91 TABLET | Refills: 3 | Status: SHIPPED | OUTPATIENT
Start: 2021-04-08 | End: 2021-05-04

## 2021-04-08 RX ORDER — ESCITALOPRAM OXALATE 10 MG/1
TABLET ORAL
Qty: 90 TABLET | Refills: 3 | Status: SHIPPED | OUTPATIENT
Start: 2021-04-08 | End: 2022-04-12

## 2021-04-08 RX ORDER — HYDROCHLOROTHIAZIDE 25 MG/1
25 TABLET ORAL DAILY
Qty: 90 TABLET | Refills: 3 | Status: SHIPPED | OUTPATIENT
Start: 2021-04-08 | End: 2022-04-12

## 2021-04-08 RX ORDER — LISINOPRIL 5 MG/1
5 TABLET ORAL DAILY
Qty: 90 TABLET | Refills: 3 | Status: SHIPPED | OUTPATIENT
Start: 2021-04-08 | End: 2022-04-12

## 2021-04-08 ASSESSMENT — ANXIETY QUESTIONNAIRES
GAD7 TOTAL SCORE: 0
6. BECOMING EASILY ANNOYED OR IRRITABLE: NOT AT ALL
1. FEELING NERVOUS, ANXIOUS, OR ON EDGE: NOT AT ALL
2. NOT BEING ABLE TO STOP OR CONTROL WORRYING: NOT AT ALL
5. BEING SO RESTLESS THAT IT IS HARD TO SIT STILL: NOT AT ALL
4. TROUBLE RELAXING: NOT AT ALL
GAD7 TOTAL SCORE: 0
7. FEELING AFRAID AS IF SOMETHING AWFUL MIGHT HAPPEN: NOT AT ALL
GAD7 TOTAL SCORE: 0
7. FEELING AFRAID AS IF SOMETHING AWFUL MIGHT HAPPEN: NOT AT ALL
3. WORRYING TOO MUCH ABOUT DIFFERENT THINGS: NOT AT ALL

## 2021-04-08 ASSESSMENT — ENCOUNTER SYMPTOMS
JOINT SWELLING: 0
ABDOMINAL PAIN: 0
PARESTHESIAS: 0
HEMATOCHEZIA: 0
FEVER: 0
FREQUENCY: 0
SHORTNESS OF BREATH: 0
NAUSEA: 0
DIZZINESS: 0
CONSTIPATION: 0
CHILLS: 0
DIARRHEA: 0
PALPITATIONS: 0
HEMATURIA: 0
DYSURIA: 0
COUGH: 0
SORE THROAT: 0
BREAST MASS: 0
HEADACHES: 0
MYALGIAS: 0
ARTHRALGIAS: 0
NERVOUS/ANXIOUS: 0
EYE PAIN: 0
HEARTBURN: 0
WEAKNESS: 0

## 2021-04-08 ASSESSMENT — PATIENT HEALTH QUESTIONNAIRE - PHQ9
SUM OF ALL RESPONSES TO PHQ QUESTIONS 1-9: 1
SUM OF ALL RESPONSES TO PHQ QUESTIONS 1-9: 1
10. IF YOU CHECKED OFF ANY PROBLEMS, HOW DIFFICULT HAVE THESE PROBLEMS MADE IT FOR YOU TO DO YOUR WORK, TAKE CARE OF THINGS AT HOME, OR GET ALONG WITH OTHER PEOPLE: NOT DIFFICULT AT ALL

## 2021-04-08 ASSESSMENT — MIFFLIN-ST. JEOR: SCORE: 1501.43

## 2021-04-08 NOTE — NURSING NOTE
"Chief Complaint   Patient presents with     Physical     Hypertension     Depression       Initial /82 (BP Location: Right arm)   Pulse 77   Temp 98  F (36.7  C) (Tympanic)   Ht 1.549 m (5' 1\")   Wt 88.9 kg (196 lb)   LMP 01/25/2021   SpO2 99%   BMI 37.03 kg/m   Estimated body mass index is 37.03 kg/m  as calculated from the following:    Height as of this encounter: 1.549 m (5' 1\").    Weight as of this encounter: 88.9 kg (196 lb).    Patient presents to the clinic using No DME    Health Maintenance that is potentially due pending provider review:  NONE    n/a    Is there anyone who you would like to be able to receive your results? No  If yes have patient fill out WADE    "

## 2021-04-08 NOTE — PROGRESS NOTES
SUBJECTIVE:   CC: Yolanda Barnes is an 40 year old woman who presents for preventive health visit.       Patient has been advised of split billing requirements and indicates understanding: Yes  Healthy Habits:     Getting at least 3 servings of Calcium per day:  Yes    Bi-annual eye exam:  NO    Dental care twice a year:  Yes    Sleep apnea or symptoms of sleep apnea:  Sleep apnea    Diet:  Regular (no restrictions)    Frequency of exercise:  6-7 days/week    Duration of exercise:  30-45 minutes    Taking medications regularly:  Yes    Medication side effects:  None    PHQ-2 Total Score: 0    Additional concerns today:  No        Hypertension Follow-up      Do you check your blood pressure regularly outside of the clinic? No     Are you following a low salt diet? No    On lisinopril, hydrochlorothiazide   BP Readings from Last 6 Encounters:   04/08/21 126/82   01/10/20 124/74   10/29/19 118/78   09/19/19 (!) 142/94   09/11/18 126/82   08/02/18 122/82        Depression and Anxiety Follow-Up    How are you doing with your depression since your last visit? No change    How are you doing with your anxiety since your last visit?  Improved     Are you having other symptoms that might be associated with depression or anxiety? No    Have you had a significant life event? No     Do you have any concerns with your use of alcohol or other drugs? No    Social History     Tobacco Use     Smoking status: Never Smoker     Smokeless tobacco: Never Used   Substance Use Topics     Alcohol use: No     Comment: rare- quit with pregnancy     Drug use: No     PHQ 3/28/2019 9/19/2019 4/8/2021   PHQ-9 Total Score 0 3 1   Q9: Thoughts of better off dead/self-harm past 2 weeks Not at all Not at all Not at all     USSANNAH-7 SCORE 9/11/2018 9/19/2019 4/8/2021   Total Score - - -   Total Score 0 (minimal anxiety) - 0 (minimal anxiety)   Total Score 0 1 0     Last PHQ-9 4/8/2021   1.  Little interest or pleasure in doing things 0   2.   Feeling down, depressed, or hopeless 0   3.  Trouble falling or staying asleep, or sleeping too much 1   4.  Feeling tired or having little energy 0   5.  Poor appetite or overeating 0   6.  Feeling bad about yourself 0   7.  Trouble concentrating 0   8.  Moving slowly or restless 0   Q9: Thoughts of better off dead/self-harm past 2 weeks 0   PHQ-9 Total Score 1   Difficulty at work, home, or with people -     SUSANNAH-7  4/8/2021   1. Feeling nervous, anxious, or on edge 0   2. Not being able to stop or control worrying 0   3. Worrying too much about different things 0   4. Trouble relaxing 0   5. Being so restless that it is hard to sit still 0   6. Becoming easily annoyed or irritable 0   7. Feeling afraid, as if something awful might happen 0   SUSANNAH-7 Total Score 0   If you checked any problems, how difficult have they made it for you to do your work, take care of things at home, or get along with other people? -     On escitalopram 10 mg  Doing well    Suicide Assessment Five-step Evaluation and Treatment (SAFE-T)      Today's PHQ-2 Score:   PHQ-2 ( 1999 [employer]) 4/8/2021   Q1: Little interest or pleasure in doing things 0   Q2: Feeling down, depressed or hopeless 0   PHQ-2 Score 0   Q1: Little interest or pleasure in doing things Not at all   Q2: Feeling down, depressed or hopeless Not at all   PHQ-2 Score 0       Abuse: Current or Past (Physical, Sexual or Emotional) - No  Do you feel safe in your environment? Yes    Have you ever done Advance Care Planning? (For example, a Health Directive, POLST, or a discussion with a medical provider or your loved ones about your wishes): No, advance care planning information given to patient to review.  Patient plans to discuss their wishes with loved ones or provider.      Social History     Tobacco Use     Smoking status: Never Smoker     Smokeless tobacco: Never Used   Substance Use Topics     Alcohol use: No     Comment: rare- quit with pregnancy         Alcohol Use  2021   Prescreen: >3 drinks/day or >7 drinks/week? No   Prescreen: >3 drinks/day or >7 drinks/week? -       Reviewed orders with patient.  Reviewed health maintenance and updated orders accordingly - Yes  BP Readings from Last 3 Encounters:   21 126/82   01/10/20 124/74   10/29/19 118/78    Wt Readings from Last 3 Encounters:   21 88.9 kg (196 lb)   01/10/20 85.3 kg (188 lb)   10/29/19 82.7 kg (182 lb 6.4 oz)                Breast Cancer Screening:    Breast CA Risk Assessment (FHS-7) 2021   Do you have a family history of breast, colon, or ovarian cancer? No / Unknown         Patient under 40 years of age: Routine Mammogram Screening not recommended.   Pertinent mammograms are reviewed under the imaging tab.    History of abnormal Pap smear: NO - age 30-65 PAP every 5 years with negative HPV co-testing recommended  PAP / HPV Latest Ref Rng & Units 2017 2014 10/26/2012   PAP - NIL ASC-US(A) NIL   HPV 16 DNA NEG:Negative Negative - -   HPV 18 DNA NEG:Negative Negative - -   OTHER HR HPV NEG:Negative Negative - -     Reviewed and updated as needed this visit by clinical staff  Tobacco  Allergies  Meds              Reviewed and updated as needed this visit by Provider                Past Medical History:   Diagnosis Date     Anxiety      ASCUS favor benign 2014    Neg HPV - cotest in 3 yrs     Chickenpox      Dysplasia of cervix, unspecified age 17    [facility]     Human papillomavirus in conditions classified elsewhere and of unspecified site     age 18     Irritable bowel syndrome      Major depressive disorder, recurrent episode, moderate (H)     major depressive affective disorder, recurrent episodesDr. MER Loera, psychiatrist.       Past Surgical History:   Procedure Laterality Date      SECTION  2013    Procedure:  SECTION;   section;  Surgeon: Nimco Powell MD;  Location: [facility]       "SECTION, TUBAL LIGATION, COMBINED N/A 2015    Procedure: COMBINED  SECTION, TUBAL LIGATION;  Surgeon: Karon Beckett MD;  Location: [facility] OR     MOUTH SURGERY      wisdom teeth     OB History    Para Term  AB Living   2 2 2 0 0 2   SAB TAB Ectopic Multiple Live Births   0 0 0 0 2      # Outcome Date GA Lbr Jesus/2nd Weight Sex Delivery Anes PTL Lv   2 Term 02/06/15 37w6d  3.657 kg (8 lb 1 oz) F CS-LTranv Spinal  SAM      Apgar1: 7  Apgar5: 8   1 Term 13 39w0d  3.289 kg (7 lb 4 oz) M CS-LTranv EPI N SAM      Name: CHIDI MYRICK      Apgar1: 3  Apgar5: 7       Review of Systems   Constitutional: Negative for chills and fever.   HENT: Negative for congestion, ear pain, hearing loss and sore throat.    Eyes: Negative for pain and visual disturbance.   Respiratory: Negative for cough and shortness of breath.    Cardiovascular: Negative for chest pain, palpitations and peripheral edema.   Gastrointestinal: Negative for abdominal pain, constipation, diarrhea, heartburn, hematochezia and nausea.   Breasts:  Negative for tenderness, breast mass and discharge.   Genitourinary: Negative for dysuria, frequency, genital sores, hematuria, pelvic pain, urgency, vaginal bleeding and vaginal discharge.   Musculoskeletal: Negative for arthralgias, joint swelling and myalgias.   Skin: Negative for rash.   Neurological: Negative for dizziness, weakness, headaches and paresthesias.   Psychiatric/Behavioral: Negative for mood changes. The patient is not nervous/anxious.         OBJECTIVE:   /82 (BP Location: Right arm)   Pulse 77   Temp 98  F (36.7  C) (Tympanic)   Ht 1.549 m (5' 1\")   Wt 88.9 kg (196 lb)   LMP 2021   SpO2 99%   BMI 37.03 kg/m    Physical Exam  GENERAL: healthy, alert and no distress  EYES: Eyes grossly normal to inspection, PERRL and conjunctivae and sclerae normal  HENT: ear canals and TM's normal, nose and mouth without ulcers or lesions  NECK: no adenopathy, no " "asymmetry, masses, or scars and thyroid normal to palpation  RESP: lungs clear to auscultation - no rales, rhonchi or wheezes  BREAST: normal without masses, tenderness or nipple discharge and no palpable axillary masses or adenopathy  CV: regular rate and rhythm, normal S1 S2, no S3 or S4, no murmur, click or rub, no peripheral edema and peripheral pulses strong  ABDOMEN: soft, nontender, no hepatosplenomegaly, no masses and bowel sounds normal  MS: no gross musculoskeletal defects noted, no edema  SKIN: no suspicious lesions or rashes  NEURO: Normal strength and tone, mentation intact and speech normal  PSYCH: mentation appears normal, affect normal/bright      ASSESSMENT/PLAN:   Yolanda was seen today for physical, hypertension and depression.    Diagnoses and all orders for this visit:    Routine general medical examination at a health care facility    Menorrhagia with regular cycle  -     levonorgestrel-ethinyl estradiol (SETLAKIN) 0.15-0.03 MG tablet; Take 1 tablet by mouth daily    Benign essential hypertension  -     lisinopril (ZESTRIL) 5 MG tablet; Take 1 tablet (5 mg) by mouth daily  -     hydrochlorothiazide (HYDRODIURIL) 25 MG tablet; Take 1 tablet (25 mg) by mouth daily  -     Comprehensive metabolic panel  -     CBC with platelets and differential    SUSANNAH (generalized anxiety disorder)  -     escitalopram (LEXAPRO) 10 MG tablet; TAKE ONE TABLET BY MOUTH ONCE DAILY    CARDIOVASCULAR SCREENING; LDL GOAL LESS THAN 160  -     Lipid panel reflex to direct LDL Fasting    Recurrent major depressive disorder, in remission (H)        Patient has been advised of split billing requirements and indicates understanding: Yes  COUNSELING:  Reviewed preventive health counseling, as reflected in patient instructions    Estimated body mass index is 37.03 kg/m  as calculated from the following:    Height as of this encounter: 1.549 m (5' 1\").    Weight as of this encounter: 88.9 kg (196 lb).    Weight management plan: " Discussed healthy diet and exercise guidelines    She reports that she has never smoked. She has never used smokeless tobacco.      Counseling Resources:  ATP IV Guidelines  Pooled Cohorts Equation Calculator  Breast Cancer Risk Calculator  BRCA-Related Cancer Risk Assessment: FHS-7 Tool  FRAX Risk Assessment  ICSI Preventive Guidelines  Dietary Guidelines for Americans, 2010  USDA's MyPlate  ASA Prophylaxis  Lung CA Screening    Karon Barrett MD  [facility]

## 2021-04-09 ASSESSMENT — PATIENT HEALTH QUESTIONNAIRE - PHQ9: SUM OF ALL RESPONSES TO PHQ QUESTIONS 1-9: 1

## 2021-04-09 ASSESSMENT — ANXIETY QUESTIONNAIRES: GAD7 TOTAL SCORE: 0

## 2021-05-01 DIAGNOSIS — N92.0 MENORRHAGIA WITH REGULAR CYCLE: ICD-10-CM

## 2021-05-04 RX ORDER — LEVONORGESTREL AND ETHINYL ESTRADIOL 0.15-0.03
1 KIT ORAL DAILY
Qty: 91 TABLET | Refills: 3 | Status: SHIPPED | OUTPATIENT
Start: 2021-05-04 | End: 2022-04-12

## 2021-09-18 ENCOUNTER — HEALTH MAINTENANCE LETTER (OUTPATIENT)
Age: 41
End: 2021-09-18

## 2022-03-25 ASSESSMENT — SLEEP AND FATIGUE QUESTIONNAIRES
HOW LIKELY ARE YOU TO NOD OFF OR FALL ASLEEP WHILE SITTING AND READING: WOULD NEVER DOZE
HOW LIKELY ARE YOU TO NOD OFF OR FALL ASLEEP WHILE LYING DOWN TO REST IN THE AFTERNOON WHEN CIRCUMSTANCES PERMIT: WOULD NEVER DOZE
HOW LIKELY ARE YOU TO NOD OFF OR FALL ASLEEP WHILE SITTING AND TALKING TO SOMEONE: WOULD NEVER DOZE
HOW LIKELY ARE YOU TO NOD OFF OR FALL ASLEEP WHILE WATCHING TV: WOULD NEVER DOZE
HOW LIKELY ARE YOU TO NOD OFF OR FALL ASLEEP IN A CAR, WHILE STOPPED FOR A FEW MINUTES IN TRAFFIC: WOULD NEVER DOZE
HOW LIKELY ARE YOU TO NOD OFF OR FALL ASLEEP WHEN YOU ARE A PASSENGER IN A CAR FOR AN HOUR WITHOUT A BREAK: WOULD NEVER DOZE
HOW LIKELY ARE YOU TO NOD OFF OR FALL ASLEEP WHILE SITTING INACTIVE IN A PUBLIC PLACE: WOULD NEVER DOZE
HOW LIKELY ARE YOU TO NOD OFF OR FALL ASLEEP WHILE SITTING QUIETLY AFTER LUNCH WITHOUT ALCOHOL: WOULD NEVER DOZE

## 2022-03-28 ENCOUNTER — VIRTUAL VISIT (OUTPATIENT)
Dept: SLEEP MEDICINE | Facility: CLINIC | Age: 42
End: 2022-03-28
Payer: COMMERCIAL

## 2022-03-28 VITALS — WEIGHT: 190 LBS | BODY MASS INDEX: 35.87 KG/M2 | HEIGHT: 61 IN

## 2022-03-28 DIAGNOSIS — G47.33 OSA (OBSTRUCTIVE SLEEP APNEA): Primary | ICD-10-CM

## 2022-03-28 PROCEDURE — 99213 OFFICE O/P EST LOW 20 MIN: CPT | Mod: 95 | Performed by: FAMILY MEDICINE

## 2022-03-28 ASSESSMENT — PAIN SCALES - GENERAL: PAINLEVEL: NO PAIN (0)

## 2022-03-28 NOTE — PROGRESS NOTES
"Yolanda is a 41 year old who is being evaluated via a billable video visit.      How would you like to obtain your AVS? MyChart  If the video visit is dropped, the invitation should be resent by: Text to cell phone: [phone]  Will anyone else be joining your video visit? No  {If patient encounters technical issues they should call [phone] :[id number]}Bonifacio Dela Cruz      Video Start Time: {video visit start/end time for provider to select:152948}  Video-Visit Details    Type of service:  Video Visit    Video End Time:{video visit start/end time for provider to select:152948}    Originating Location (pt. Location): {video visit patient location:306850::\"Home\"}    Distant Location (provider location):  [facility]     Platform used for Video Visit: {Virtual Visit Platforms:579859::\"Lamiecco\"}  "

## 2022-03-28 NOTE — PROGRESS NOTES
"Yolanda Barnes is a 41 year old female who is being evaluated via a billable video visit.       The patient has been notified of following:      \"This video visit will be conducted via a call between you and your physician/provider. We have found that certain health care needs can be provided without the need for an in-person physical exam.  This service lets us provide the care you need with a video conversation.  If a prescription is necessary we can send it directly to your pharmacy.  If lab work is needed we can place an order for that and you can then stop by our lab to have the test done at a later time.     Video visits are billed at different rates depending on your insurance coverage.  Please reach out to your insurance provider with any questions.     If during the course of the call the physician/provider feels a video visit is not appropriate, you will not be charged for this service.\"     Patient has given verbal consent for Video visit? Yes  How would you like to obtain your AVS? Mail a copy  If you are dropped from the video visit, the video invite should be resent to: Text to cell phone: -  Will anyone else be joining your video visit? No  If patient encounters technical issues they should call [phone]      Video-Visit Details     Type of service:  Video Visit     Video Start Time: 0930  Video End Time: 0940    Originating Location (pt. Location): Home     Distant Location (provider location):  [facility]      Platform used for Video Visit: CICCWORLD    Virtual visit for annual follow-up of severe GEOVANI managed with CPAP.     Assessment:  - Severe GEOVANI with significant sleep-associated hypoxemia  - Appears well controlled with excellent compliance on CPAP auto-titrate 5-15 cm H2O    Plan:  - Continue CPAP auto-titrate 5-15 cm H2O.  - Recommended and discussed weight management.  - Plan for follow-up in 1-2 years    SUBJECTIVE:  Yolanda Barnes is a 41 year old " "female.    Pertinent PMHx of GEOVANI, anxiety, SUSANNAH, MDD, HTN, obesity.    Prior Sleep Testin2019 - HST with weight 182 lbs, BMI 34.3.  AHI 94.2.  sustained hypoxemia was not present. Baseline oxygen saturation was 93.8%.  Time with saturation less than or equal to 88% was 173 minutes. The lowest oxygen saturation was 73%.     10/29/2019 - Initial consult, high clinical concern for GEOVANI.  HST ordered.    2019 - Set up on CPAP auto-titrate 5-15 cm H2O.    Today -she presents today and feels that she is doing incredibly well with her CPAP.  She feels that it is a \"life changer\".  She is sleeping well, feels very well rested, feels that she is actually breathing while sleeping.  She has no concerns today.    CPAP download reviewed over past 30 days on auto-titrate 5-15 cm H2O.  Used 30/30 days, average daily usage 483 minutes.  Pressure 95th%ile 13.6 cm H2O.  AHI 0.2.  Resmed device.    Insomnia Severity Index    Difficulty falling asleep 1    Difficulty staying asleep 0    Problems waking up too early 0    How SATISFIED/DISSATISFIED are you with your CURRENT sleep pattern? 0    How NOTICEABLE to others do you think your sleep problem is in terms of impairing the quality of your life? 0    How WORRIED/DISTRESSED are you about your current sleep problem? 0    To what extent do you consider your sleep problem to INTERFERE with your daily functioning (e.g. daytime fatigue, mood, ability to function at work/daily chores, concentration, memory, mood, etc.) CURRENTLY? 0    Total Score 1        Past medical history:    Patient Active Problem List    Diagnosis Date Noted     GEOVANI (obstructive sleep apnea) 2019     Priority: Medium     Severe GEOVANI with sleep-associated hypoxemia.    HST on 2019 with weight 182 lbs, analysis time 575.9 minutes.  AHI 94.2, baseline SpO2 93.8%, selina SpO2 73%, <= 88% for 173 minutes.  No clear sustained hypoxemia.  61% of recording supine, no clear positional component.       " Obesity (BMI 35.0-39.9) with comorbidity (H) 09/19/2019     Priority: Medium     SUSANNAH (generalized anxiety disorder) 08/02/2018     Priority: Medium     Recurrent major depressive disorder, in remission (H) 08/02/2018     Priority: Medium     Benign essential hypertension 08/02/2018     Priority: Medium     Acne 03/04/2013     Priority: Medium     CARDIOVASCULAR SCREENING; LDL GOAL LESS THAN 160 10/31/2010     Priority: Medium       10 point ROS of systems including Constitutional, Eyes, Respiratory, Cardiovascular, Gastroenterology, Genitourinary, Integumentary, Muscularskeletal, Psychiatric were all negative except for pertinent positives noted in my HPI.    Current Outpatient Medications   Medication Sig Dispense Refill     escitalopram (LEXAPRO) 10 MG tablet TAKE ONE TABLET BY MOUTH ONCE DAILY 90 tablet 3     hydrochlorothiazide (HYDRODIURIL) 25 MG tablet Take 1 tablet (25 mg) by mouth daily 90 tablet 3     levonorgestrel-ethinyl estradiol (SETLAKIN) 0.15-0.03 MG tablet Take 1 tablet by mouth daily 91 tablet 3     lisinopril (ZESTRIL) 5 MG tablet Take 1 tablet (5 mg) by mouth daily 90 tablet 3       OBJECTIVE:  There were no vitals taken for this visit.    Physical Exam     ---  This note was written with the assistance of the Dragon voice-dictation technology software. The final document, although reviewed, may contain errors. For corrections, please contact the office.    Rodolfo Zuleta MD    Sleep Medicine  [facility]  ([phone])  [facility]  ([phone])    Time spent on the date of service:  15 minutes.

## 2022-04-12 ENCOUNTER — OFFICE VISIT (OUTPATIENT)
Dept: FAMILY MEDICINE | Facility: CLINIC | Age: 42
End: 2022-04-12
Payer: COMMERCIAL

## 2022-04-12 VITALS
HEART RATE: 80 BPM | HEIGHT: 62 IN | SYSTOLIC BLOOD PRESSURE: 138 MMHG | RESPIRATION RATE: 28 BRPM | TEMPERATURE: 99 F | OXYGEN SATURATION: 99 % | DIASTOLIC BLOOD PRESSURE: 76 MMHG | WEIGHT: 194 LBS | BODY MASS INDEX: 35.7 KG/M2

## 2022-04-12 DIAGNOSIS — I10 BENIGN ESSENTIAL HYPERTENSION: ICD-10-CM

## 2022-04-12 DIAGNOSIS — Z12.4 CERVICAL CANCER SCREENING: ICD-10-CM

## 2022-04-12 DIAGNOSIS — Z13.6 CARDIOVASCULAR SCREENING; LDL GOAL LESS THAN 160: ICD-10-CM

## 2022-04-12 DIAGNOSIS — Z00.00 ROUTINE GENERAL MEDICAL EXAMINATION AT A HEALTH CARE FACILITY: Primary | ICD-10-CM

## 2022-04-12 DIAGNOSIS — Z11.59 NEED FOR HEPATITIS C SCREENING TEST: ICD-10-CM

## 2022-04-12 DIAGNOSIS — N92.0 MENORRHAGIA WITH REGULAR CYCLE: ICD-10-CM

## 2022-04-12 DIAGNOSIS — E66.01 MORBID OBESITY (H): ICD-10-CM

## 2022-04-12 PROBLEM — F33.40 RECURRENT MAJOR DEPRESSIVE DISORDER, IN REMISSION (H): Chronic | Status: RESOLVED | Noted: 2018-08-02 | Resolved: 2022-04-12

## 2022-04-12 LAB
ALBUMIN SERPL-MCNC: 3.9 G/DL (ref 3.4–5)
ALP SERPL-CCNC: 45 U/L (ref 40–150)
ALT SERPL W P-5'-P-CCNC: 25 U/L (ref 0–50)
ANION GAP SERPL CALCULATED.3IONS-SCNC: 9 MMOL/L (ref 3–14)
AST SERPL W P-5'-P-CCNC: 15 U/L (ref 0–45)
BASOPHILS # BLD AUTO: 0 10E3/UL (ref 0–0.2)
BASOPHILS NFR BLD AUTO: 0 %
BILIRUB SERPL-MCNC: 0.4 MG/DL (ref 0.2–1.3)
BUN SERPL-MCNC: 18 MG/DL (ref 7–30)
CALCIUM SERPL-MCNC: 9.4 MG/DL (ref 8.5–10.1)
CHLORIDE BLD-SCNC: 102 MMOL/L (ref 94–109)
CHOLEST SERPL-MCNC: 237 MG/DL
CO2 SERPL-SCNC: 27 MMOL/L (ref 20–32)
CREAT SERPL-MCNC: 0.74 MG/DL (ref 0.52–1.04)
EOSINOPHIL # BLD AUTO: 0.2 10E3/UL (ref 0–0.7)
EOSINOPHIL NFR BLD AUTO: 2 %
ERYTHROCYTE [DISTWIDTH] IN BLOOD BY AUTOMATED COUNT: 12.7 % (ref 10–15)
FASTING STATUS PATIENT QL REPORTED: YES
GFR SERPL CREATININE-BSD FRML MDRD: >90 ML/MIN/1.73M2
GLUCOSE BLD-MCNC: 100 MG/DL (ref 70–99)
HCT VFR BLD AUTO: 45.9 % (ref 35–47)
HCV AB SERPL QL IA: NONREACTIVE
HDLC SERPL-MCNC: 45 MG/DL
HGB BLD-MCNC: 14.9 G/DL (ref 11.7–15.7)
LDLC SERPL CALC-MCNC: 147 MG/DL
LYMPHOCYTES # BLD AUTO: 2.5 10E3/UL (ref 0.8–5.3)
LYMPHOCYTES NFR BLD AUTO: 37 %
MCH RBC QN AUTO: 29.8 PG (ref 26.5–33)
MCHC RBC AUTO-ENTMCNC: 32.5 G/DL (ref 31.5–36.5)
MCV RBC AUTO: 92 FL (ref 78–100)
MONOCYTES # BLD AUTO: 0.5 10E3/UL (ref 0–1.3)
MONOCYTES NFR BLD AUTO: 7 %
NEUTROPHILS # BLD AUTO: 3.7 10E3/UL (ref 1.6–8.3)
NEUTROPHILS NFR BLD AUTO: 54 %
NONHDLC SERPL-MCNC: 192 MG/DL
PLATELET # BLD AUTO: 218 10E3/UL (ref 150–450)
POTASSIUM BLD-SCNC: 3.8 MMOL/L (ref 3.4–5.3)
PROT SERPL-MCNC: 7.7 G/DL (ref 6.8–8.8)
RBC # BLD AUTO: 5 10E6/UL (ref 3.8–5.2)
SODIUM SERPL-SCNC: 138 MMOL/L (ref 133–144)
TRIGL SERPL-MCNC: 224 MG/DL
WBC # BLD AUTO: 6.8 10E3/UL (ref 4–11)

## 2022-04-12 PROCEDURE — 80053 COMPREHEN METABOLIC PANEL: CPT | Performed by: FAMILY MEDICINE

## 2022-04-12 PROCEDURE — 99396 PREV VISIT EST AGE 40-64: CPT | Performed by: FAMILY MEDICINE

## 2022-04-12 PROCEDURE — 99214 OFFICE O/P EST MOD 30 MIN: CPT | Mod: 25 | Performed by: FAMILY MEDICINE

## 2022-04-12 PROCEDURE — 36415 COLL VENOUS BLD VENIPUNCTURE: CPT | Performed by: FAMILY MEDICINE

## 2022-04-12 PROCEDURE — 80061 LIPID PANEL: CPT | Performed by: FAMILY MEDICINE

## 2022-04-12 PROCEDURE — 87624 HPV HI-RISK TYP POOLED RSLT: CPT | Performed by: FAMILY MEDICINE

## 2022-04-12 PROCEDURE — G0145 SCR C/V CYTO,THINLAYER,RESCR: HCPCS | Performed by: FAMILY MEDICINE

## 2022-04-12 PROCEDURE — 86803 HEPATITIS C AB TEST: CPT | Performed by: FAMILY MEDICINE

## 2022-04-12 PROCEDURE — 85025 COMPLETE CBC W/AUTO DIFF WBC: CPT | Performed by: FAMILY MEDICINE

## 2022-04-12 RX ORDER — LEVONORGESTREL AND ETHINYL ESTRADIOL 0.15-0.03
1 KIT ORAL DAILY
Qty: 91 TABLET | Refills: 3 | Status: SHIPPED | OUTPATIENT
Start: 2022-04-12 | End: 2023-05-01

## 2022-04-12 RX ORDER — LISINOPRIL 5 MG/1
5 TABLET ORAL DAILY
Qty: 90 TABLET | Refills: 3 | Status: SHIPPED | OUTPATIENT
Start: 2022-04-12 | End: 2023-05-01

## 2022-04-12 RX ORDER — HYDROCHLOROTHIAZIDE 25 MG/1
25 TABLET ORAL DAILY
Qty: 90 TABLET | Refills: 3 | Status: SHIPPED | OUTPATIENT
Start: 2022-04-12 | End: 2023-04-06

## 2022-04-12 ASSESSMENT — ENCOUNTER SYMPTOMS
DYSURIA: 0
HEMATURIA: 0
WEAKNESS: 0
PARESTHESIAS: 0
FREQUENCY: 0
HEMATOCHEZIA: 0
EYE PAIN: 0
NERVOUS/ANXIOUS: 0
CHILLS: 0
CONSTIPATION: 0
NAUSEA: 0
ARTHRALGIAS: 0
HEADACHES: 0
JOINT SWELLING: 0
HEARTBURN: 0
SORE THROAT: 0
MYALGIAS: 0
ABDOMINAL PAIN: 0
FEVER: 0
PALPITATIONS: 0
DIZZINESS: 0
BREAST MASS: 0
SHORTNESS OF BREATH: 0
DIARRHEA: 0
COUGH: 0

## 2022-04-12 ASSESSMENT — ANXIETY QUESTIONNAIRES
1. FEELING NERVOUS, ANXIOUS, OR ON EDGE: NOT AT ALL
GAD7 TOTAL SCORE: 0
3. WORRYING TOO MUCH ABOUT DIFFERENT THINGS: NOT AT ALL
6. BECOMING EASILY ANNOYED OR IRRITABLE: NOT AT ALL
2. NOT BEING ABLE TO STOP OR CONTROL WORRYING: NOT AT ALL
4. TROUBLE RELAXING: NOT AT ALL
GAD7 TOTAL SCORE: 0
5. BEING SO RESTLESS THAT IT IS HARD TO SIT STILL: NOT AT ALL
GAD7 TOTAL SCORE: 0
7. FEELING AFRAID AS IF SOMETHING AWFUL MIGHT HAPPEN: NOT AT ALL
7. FEELING AFRAID AS IF SOMETHING AWFUL MIGHT HAPPEN: NOT AT ALL

## 2022-04-12 ASSESSMENT — PATIENT HEALTH QUESTIONNAIRE - PHQ9
SUM OF ALL RESPONSES TO PHQ QUESTIONS 1-9: 0
SUM OF ALL RESPONSES TO PHQ QUESTIONS 1-9: 0

## 2022-04-12 NOTE — PATIENT INSTRUCTIONS
Get your mammogram done    Try off of the oral contraceptive pills and see what your body does        Preventive Health Recommendations  Female Ages 40 to 49    Yearly exam:   See your health care provider every year in order to  Review health changes.   Discuss preventive care.    Review your medicines if your doctor prescribed any.    Get a Pap test every three years (unless you have an abnormal result and your provider advises testing more often).    If you get Pap tests with HPV test, you only need to test every 5 years, unless you have an abnormal result. You do not need a Pap test if your uterus was removed (hysterectomy) and you have not had cancer.    You should be tested each year for STDs (sexually transmitted diseases), if you're at risk.   Ask your doctor if you should have a mammogram.    Have a colonoscopy (test for colon cancer) if someone in your family has had colon cancer or polyps before age 50.     Have a cholesterol test every 5 years.     Have a diabetes test (fasting glucose) after age 45. If you are at risk for diabetes, you should have this test every 3 years.    Shots: Get a flu shot each year. Get a tetanus shot every 10 years.     Nutrition:   Eat at least 5 servings of fruits and vegetables each day.  Eat whole-grain bread, whole-wheat pasta and brown rice instead of white grains and rice.  Get adequate Calcium and Vitamin D.      Lifestyle  Exercise at least 150 minutes a week (an average of 30 minutes a day, 5 days a week). This will help you control your weight and prevent disease.  Limit alcohol to one drink per day.  No smoking.   Wear sunscreen to prevent skin cancer.  See your dentist every six months for an exam and cleaning.

## 2022-04-12 NOTE — PROGRESS NOTES
SUBJECTIVE:   CC: Yolanda Barnes is an 41 year old woman who presents for preventive health visit.       Patient has been advised of split billing requirements and indicates understanding: Yes  Healthy Habits:     Getting at least 3 servings of Calcium per day:  Yes    Bi-annual eye exam:  NO    Dental care twice a year:  Yes    Sleep apnea or symptoms of sleep apnea:  Sleep apnea    Diet:  Regular (no restrictions)    Frequency of exercise:  1 day/week    Duration of exercise:  Less than 15 minutes    Taking medications regularly:  Yes    Medication side effects:  None    PHQ-2 Total Score: 0    Additional concerns today:  No      Hypertension Follow-up      Do you check your blood pressure regularly outside of the clinic? No     Are you following a low salt diet? No  BP Readings from Last 6 Encounters:   04/12/22 138/76   04/08/21 126/82   01/10/20 124/74   10/29/19 118/78   09/19/19 (!) 142/94   09/11/18 126/82     On lisinopril and hydrochlorothiazide     Had a BTL, but had heavy periods, and was put on oral contraceptive pills for it. That worked for years, but now last few months has not had a period on the Pill.     Today's PHQ-2 Score:   PHQ-2 ( 1999 [employer]) 4/12/2022   Q1: Little interest or pleasure in doing things 0   Q2: Feeling down, depressed or hopeless 0   PHQ-2 Score 0   PHQ-2 Total Score (12-17 Years)- Positive if 3 or more points; Administer PHQ-A if positive -   Q1: Little interest or pleasure in doing things Not at all   Q2: Feeling down, depressed or hopeless Not at all   PHQ-2 Score 0       Abuse: Current or Past (Physical, Sexual or Emotional) - No  Do you feel safe in your environment? Yes        Social History     Tobacco Use     Smoking status: Never Smoker     Smokeless tobacco: Never Used   Substance Use Topics     Alcohol use: No     Comment: rare- quit with pregnancy         Alcohol Use 4/12/2022   Prescreen: >3 drinks/day or >7 drinks/week? No   Prescreen: >3 drinks/day or >7  drinks/week? -       Reviewed orders with patient.  Reviewed health maintenance and updated orders accordingly - Yes  BP Readings from Last 3 Encounters:   22 138/76   21 126/82   01/10/20 124/74    Wt Readings from Last 3 Encounters:   22 88 kg (194 lb)   22 86.2 kg (190 lb)   21 88.9 kg (196 lb)                    Breast Cancer Screening:  Any new diagnosis of family breast, ovarian, or bowel cancer? No    Breast CA Risk Assessment (FHS-7) 2021   Do you have a family history of breast, colon, or ovarian cancer? No / Unknown         Mammogram Screening - Offered annual screening and updated Health Maintenance for mutual plan based on risk factor consideration    Pertinent mammograms are reviewed under the imaging tab.    History of abnormal Pap smear: NO - age 30-65 PAP every 5 years with negative HPV co-testing recommended  PAP / HPV Latest Ref Rng & Units 2017 2014 10/26/2012   PAP (Historical) - NIL ASC-US(A) NIL   HPV16 NEG:Negative Negative - -   HPV18 NEG:Negative Negative - -   HRHPV NEG:Negative Negative - -     Reviewed and updated as needed this visit by clinical staff   Tobacco   Meds   Med Hx  Surg Hx  Fam Hx  Soc Hx          Reviewed and updated as needed this visit by Provider                   Past Medical History:   Diagnosis Date     Anxiety      ASCUS favor benign 2014    Neg HPV - cotest in 3 yrs     Chickenpox      Dysplasia of cervix, unspecified age 17    [facility]     Human papillomavirus in conditions classified elsewhere and of unspecified site     age 18     Irritable bowel syndrome      Major depressive disorder, recurrent episode, moderate (H)     major depressive affective disorder, recurrent episodesDr. MER Loera, psychiatrist.       Past Surgical History:   Procedure Laterality Date      SECTION  2013    Procedure:  SECTION;   section;  Surgeon: Sherry  "Nimco Vee MD;  Location: [facility] OR      SECTION, TUBAL LIGATION, COMBINED N/A 2015    Procedure: COMBINED  SECTION, TUBAL LIGATION;  Surgeon: Karon Beckett MD;  Location: [facility] OR     MOUTH SURGERY      wisdom teeth     OB History    Para Term  AB Living   2 2 2 0 0 2   SAB IAB Ectopic Multiple Live Births   0 0 0 0 2      # Outcome Date GA Lbr Jesus/2nd Weight Sex Delivery Anes PTL Lv   2 Term 02/06/15 37w6d  3.657 kg (8 lb 1 oz) F CS-LTranv Spinal  SAM      Apgar1: 7  Apgar5: 8   1 Term 13 39w0d  3.289 kg (7 lb 4 oz) M CS-LTranv EPI N SAM      Name: CHIDI MYRICK      Apgar1: 3  Apgar5: 7       Review of Systems   Constitutional: Negative for chills and fever.   HENT: Negative for congestion, ear pain, hearing loss and sore throat.    Eyes: Negative for pain and visual disturbance.   Respiratory: Negative for cough and shortness of breath.    Cardiovascular: Negative for chest pain, palpitations and peripheral edema.   Gastrointestinal: Negative for abdominal pain, constipation, diarrhea, heartburn, hematochezia and nausea.   Breasts:  Negative for tenderness, breast mass and discharge.   Genitourinary: Negative for dysuria, frequency, genital sores, hematuria, pelvic pain, urgency, vaginal bleeding and vaginal discharge.   Musculoskeletal: Negative for arthralgias, joint swelling and myalgias.   Skin: Negative for rash.   Neurological: Negative for dizziness, weakness, headaches and paresthesias.   Psychiatric/Behavioral: Negative for mood changes. The patient is not nervous/anxious.         OBJECTIVE:   /76 (BP Location: Right arm)   Pulse 80   Temp 99  F (37.2  C) (Tympanic)   Resp 28   Ht 1.562 m (5' 1.5\")   Wt 88 kg (194 lb)   LMP 2021   SpO2 99%   BMI 36.06 kg/m    Physical Exam  GENERAL: healthy, alert and no distress  EYES: Eyes grossly normal to inspection, PERRL and conjunctivae and sclerae normal  HENT: mask in place  NECK: no " adenopathy, no asymmetry, masses, or scars and thyroid normal to palpation  RESP: lungs clear to auscultation - no rales, rhonchi or wheezes  BREAST: normal without masses, tenderness or nipple discharge and no palpable axillary masses or adenopathy  CV: regular rate and rhythm, normal S1 S2, no S3 or S4, no murmur, click or rub, no peripheral edema   ABDOMEN: soft, obese, nontender, no hepatosplenomegaly, no masses and bowel sounds normal   (female): normal female external genitalia, normal urethral meatus, vaginal mucosa pink, moist, well rugated, and normal cervix/adnexa/uterus without masses or discharge, pap taken  MS: no gross musculoskeletal defects noted, no edema  SKIN: no suspicious lesions or rashes  NEURO: Normal strength and tone, mentation intact and speech normal  PSYCH: mentation appears normal, affect normal/bright      ASSESSMENT/PLAN:   Yolanda was seen today for physical.    Diagnoses and all orders for this visit:    Routine general medical examination at a health care facility    Benign essential hypertension  Controlled  Continue lisinopril, hydrochlorothiazide   Room to increase lisinopril, will monitor  -     hydrochlorothiazide (HYDRODIURIL) 25 MG tablet; Take 1 tablet (25 mg) by mouth in the morning.  -     lisinopril (ZESTRIL) 5 MG tablet; Take 1 tablet (5 mg) by mouth in the morning.  -     Comprehensive metabolic panel (BMP + Alb, Alk Phos, ALT, AST, Total. Bili, TP); Future  -     CBC with platelets and differential; Future  -     Comprehensive metabolic panel (BMP + Alb, Alk Phos, ALT, AST, Total. Bili, TP)  -     CBC with platelets and differential    Menorrhagia with regular cycle  -     levonorgestrel-ethinyl estradiol (SETLAKIN) 0.15-0.03 MG tablet; Take 1 tablet by mouth in the morning.  Can try off oral contraceptive pills and see how it goes, can always restart    Need for hepatitis C screening test  -     Hepatitis C Screen Reflex to HCV RNA Quant and Genotype; Future  -      "Hepatitis C Screen Reflex to HCV RNA Quant and Genotype    Cervical cancer screening  -     Pap Screen with HPV - recommended age 30 - 65 years    CARDIOVASCULAR SCREENING; LDL GOAL LESS THAN 160  -     Lipid panel reflex to direct LDL Fasting; Future  -     Lipid panel reflex to direct LDL Fasting    Morbid obesity (H)  Diet and exercise discussed    Other orders  -     REVIEW OF HEALTH MAINTENANCE PROTOCOL ORDERS        Patient has been advised of split billing requirements and indicates understanding: Yes    COUNSELING:  Reviewed preventive health counseling, as reflected in patient instructions  Special attention given to:        Regular exercise    Estimated body mass index is 36.06 kg/m  as calculated from the following:    Height as of this encounter: 1.562 m (5' 1.5\").    Weight as of this encounter: 88 kg (194 lb).    Weight management plan: Discussed healthy diet and exercise guidelines    She reports that she has never smoked. She has never used smokeless tobacco.      Counseling Resources:  ATP IV Guidelines  Pooled Cohorts Equation Calculator  Breast Cancer Risk Calculator  BRCA-Related Cancer Risk Assessment: FHS-7 Tool  FRAX Risk Assessment  ICSI Preventive Guidelines  Dietary Guidelines for Americans, 2010  USDA's MyPlate  ASA Prophylaxis  Lung CA Screening    Karon Barrett MD  [facility]  Answers for HPI/ROS submitted by the patient on 4/12/2022  PHQ9 TOTAL SCORE: 0  SUSANNAH 7 TOTAL SCORE: 0      "

## 2022-04-13 ASSESSMENT — PATIENT HEALTH QUESTIONNAIRE - PHQ9: SUM OF ALL RESPONSES TO PHQ QUESTIONS 1-9: 0

## 2022-04-13 ASSESSMENT — ANXIETY QUESTIONNAIRES: GAD7 TOTAL SCORE: 0

## 2022-04-14 LAB
BKR LAB AP GYN ADEQUACY: NORMAL
BKR LAB AP GYN INTERPRETATION: NORMAL
BKR LAB AP HPV REFLEX: NORMAL
BKR LAB AP LMP: NORMAL
BKR LAB AP PREVIOUS ABNORMAL: NORMAL
PATH REPORT.COMMENTS IMP SPEC: NORMAL
PATH REPORT.COMMENTS IMP SPEC: NORMAL
PATH REPORT.RELEVANT HX SPEC: NORMAL

## 2022-04-15 LAB
HUMAN PAPILLOMA VIRUS 16 DNA: NEGATIVE
HUMAN PAPILLOMA VIRUS 18 DNA: NEGATIVE
HUMAN PAPILLOMA VIRUS FINAL DIAGNOSIS: NORMAL
HUMAN PAPILLOMA VIRUS OTHER HR: NEGATIVE

## 2022-11-20 ENCOUNTER — HEALTH MAINTENANCE LETTER (OUTPATIENT)
Age: 42
End: 2022-11-20

## 2022-12-19 ENCOUNTER — ANCILLARY PROCEDURE (OUTPATIENT)
Dept: MAMMOGRAPHY | Facility: CLINIC | Age: 42
End: 2022-12-19
Payer: COMMERCIAL

## 2022-12-19 DIAGNOSIS — Z13.820 ENCOUNTER FOR SCREENING FOR OSTEOPOROSIS: ICD-10-CM

## 2022-12-19 PROCEDURE — 77067 SCR MAMMO BI INCL CAD: CPT | Mod: TC | Performed by: RADIOLOGY

## 2022-12-19 PROCEDURE — 77063 BREAST TOMOSYNTHESIS BI: CPT | Mod: TC | Performed by: RADIOLOGY

## 2023-04-06 DIAGNOSIS — I10 BENIGN ESSENTIAL HYPERTENSION: ICD-10-CM

## 2023-04-06 RX ORDER — HYDROCHLOROTHIAZIDE 25 MG/1
TABLET ORAL
Qty: 90 TABLET | Refills: 0 | Status: SHIPPED | OUTPATIENT
Start: 2023-04-06 | End: 2023-05-01

## 2023-04-28 ASSESSMENT — ANXIETY QUESTIONNAIRES
1. FEELING NERVOUS, ANXIOUS, OR ON EDGE: NOT AT ALL
2. NOT BEING ABLE TO STOP OR CONTROL WORRYING: NOT AT ALL
6. BECOMING EASILY ANNOYED OR IRRITABLE: NOT AT ALL
GAD7 TOTAL SCORE: 0
7. FEELING AFRAID AS IF SOMETHING AWFUL MIGHT HAPPEN: NOT AT ALL
3. WORRYING TOO MUCH ABOUT DIFFERENT THINGS: NOT AT ALL
5. BEING SO RESTLESS THAT IT IS HARD TO SIT STILL: NOT AT ALL
4. TROUBLE RELAXING: NOT AT ALL
IF YOU CHECKED OFF ANY PROBLEMS ON THIS QUESTIONNAIRE, HOW DIFFICULT HAVE THESE PROBLEMS MADE IT FOR YOU TO DO YOUR WORK, TAKE CARE OF THINGS AT HOME, OR GET ALONG WITH OTHER PEOPLE: NOT DIFFICULT AT ALL

## 2023-05-01 ENCOUNTER — OFFICE VISIT (OUTPATIENT)
Dept: FAMILY MEDICINE | Facility: CLINIC | Age: 43
End: 2023-05-01
Payer: COMMERCIAL

## 2023-05-01 VITALS
WEIGHT: 190.6 LBS | OXYGEN SATURATION: 100 % | SYSTOLIC BLOOD PRESSURE: 124 MMHG | HEART RATE: 74 BPM | BODY MASS INDEX: 35.07 KG/M2 | DIASTOLIC BLOOD PRESSURE: 88 MMHG | TEMPERATURE: 98 F | HEIGHT: 62 IN | RESPIRATION RATE: 20 BRPM

## 2023-05-01 DIAGNOSIS — A63.0 GENITAL WARTS: ICD-10-CM

## 2023-05-01 DIAGNOSIS — Z13.6 CARDIOVASCULAR SCREENING; LDL GOAL LESS THAN 160: ICD-10-CM

## 2023-05-01 DIAGNOSIS — L82.1 SEBORRHEIC KERATOSES: ICD-10-CM

## 2023-05-01 DIAGNOSIS — E66.01 MORBID OBESITY (H): ICD-10-CM

## 2023-05-01 DIAGNOSIS — I10 BENIGN ESSENTIAL HYPERTENSION: ICD-10-CM

## 2023-05-01 DIAGNOSIS — Z00.00 ROUTINE GENERAL MEDICAL EXAMINATION AT A HEALTH CARE FACILITY: Primary | ICD-10-CM

## 2023-05-01 DIAGNOSIS — R73.9 ELEVATED BLOOD SUGAR: ICD-10-CM

## 2023-05-01 LAB
ALBUMIN SERPL BCG-MCNC: 4.4 G/DL (ref 3.5–5.2)
ALP SERPL-CCNC: 60 U/L (ref 35–104)
ALT SERPL W P-5'-P-CCNC: 21 U/L (ref 10–35)
ANION GAP SERPL CALCULATED.3IONS-SCNC: 7 MMOL/L (ref 7–15)
AST SERPL W P-5'-P-CCNC: 17 U/L (ref 10–35)
BASOPHILS # BLD AUTO: 0 10E3/UL (ref 0–0.2)
BASOPHILS NFR BLD AUTO: 0 %
BILIRUB SERPL-MCNC: 0.2 MG/DL
BUN SERPL-MCNC: 14.7 MG/DL (ref 6–20)
CALCIUM SERPL-MCNC: 9.8 MG/DL (ref 8.6–10)
CHLORIDE SERPL-SCNC: 105 MMOL/L (ref 98–107)
CHOLEST SERPL-MCNC: 217 MG/DL
CREAT SERPL-MCNC: 0.79 MG/DL (ref 0.51–0.95)
DEPRECATED HCO3 PLAS-SCNC: 29 MMOL/L (ref 22–29)
EOSINOPHIL # BLD AUTO: 0.1 10E3/UL (ref 0–0.7)
EOSINOPHIL NFR BLD AUTO: 2 %
ERYTHROCYTE [DISTWIDTH] IN BLOOD BY AUTOMATED COUNT: 12.3 % (ref 10–15)
GFR SERPL CREATININE-BSD FRML MDRD: >90 ML/MIN/1.73M2
GLUCOSE SERPL-MCNC: 114 MG/DL (ref 70–99)
HCT VFR BLD AUTO: 41 % (ref 35–47)
HDLC SERPL-MCNC: 48 MG/DL
HGB BLD-MCNC: 13.7 G/DL (ref 11.7–15.7)
IMM GRANULOCYTES # BLD: 0 10E3/UL
IMM GRANULOCYTES NFR BLD: 0 %
LDLC SERPL CALC-MCNC: 139 MG/DL
LYMPHOCYTES # BLD AUTO: 1.8 10E3/UL (ref 0.8–5.3)
LYMPHOCYTES NFR BLD AUTO: 33 %
MCH RBC QN AUTO: 29.9 PG (ref 26.5–33)
MCHC RBC AUTO-ENTMCNC: 33.4 G/DL (ref 31.5–36.5)
MCV RBC AUTO: 90 FL (ref 78–100)
MONOCYTES # BLD AUTO: 0.4 10E3/UL (ref 0–1.3)
MONOCYTES NFR BLD AUTO: 7 %
NEUTROPHILS # BLD AUTO: 3.1 10E3/UL (ref 1.6–8.3)
NEUTROPHILS NFR BLD AUTO: 57 %
NONHDLC SERPL-MCNC: 169 MG/DL
PLATELET # BLD AUTO: 208 10E3/UL (ref 150–450)
POTASSIUM SERPL-SCNC: 3.9 MMOL/L (ref 3.4–5.3)
PROT SERPL-MCNC: 6.9 G/DL (ref 6.4–8.3)
RBC # BLD AUTO: 4.58 10E6/UL (ref 3.8–5.2)
SODIUM SERPL-SCNC: 141 MMOL/L (ref 136–145)
TRIGL SERPL-MCNC: 151 MG/DL
TSH SERPL DL<=0.005 MIU/L-ACNC: 1.03 UIU/ML (ref 0.3–4.2)
WBC # BLD AUTO: 5.5 10E3/UL (ref 4–11)

## 2023-05-01 PROCEDURE — 99214 OFFICE O/P EST MOD 30 MIN: CPT | Mod: 25 | Performed by: FAMILY MEDICINE

## 2023-05-01 PROCEDURE — 36415 COLL VENOUS BLD VENIPUNCTURE: CPT | Performed by: FAMILY MEDICINE

## 2023-05-01 PROCEDURE — 83036 HEMOGLOBIN GLYCOSYLATED A1C: CPT | Performed by: FAMILY MEDICINE

## 2023-05-01 PROCEDURE — 80061 LIPID PANEL: CPT | Performed by: FAMILY MEDICINE

## 2023-05-01 PROCEDURE — 99396 PREV VISIT EST AGE 40-64: CPT | Performed by: FAMILY MEDICINE

## 2023-05-01 PROCEDURE — 80050 GENERAL HEALTH PANEL: CPT | Performed by: FAMILY MEDICINE

## 2023-05-01 RX ORDER — LISINOPRIL 5 MG/1
5 TABLET ORAL DAILY
Qty: 90 TABLET | Refills: 3 | Status: SHIPPED | OUTPATIENT
Start: 2023-05-01 | End: 2024-06-07

## 2023-05-01 RX ORDER — HYDROCHLOROTHIAZIDE 25 MG/1
25 TABLET ORAL EVERY MORNING
Qty: 90 TABLET | Refills: 3 | Status: SHIPPED | OUTPATIENT
Start: 2023-05-01 | End: 2024-06-07

## 2023-05-01 RX ORDER — IMIQUIMOD 12.5 MG/.25G
CREAM TOPICAL
Qty: 12 PACKET | Refills: 3 | Status: SHIPPED | OUTPATIENT
Start: 2023-05-01

## 2023-05-01 ASSESSMENT — ANXIETY QUESTIONNAIRES
1. FEELING NERVOUS, ANXIOUS, OR ON EDGE: NOT AT ALL
3. WORRYING TOO MUCH ABOUT DIFFERENT THINGS: NOT AT ALL
6. BECOMING EASILY ANNOYED OR IRRITABLE: NOT AT ALL
GAD7 TOTAL SCORE: 0
2. NOT BEING ABLE TO STOP OR CONTROL WORRYING: NOT AT ALL
8. IF YOU CHECKED OFF ANY PROBLEMS, HOW DIFFICULT HAVE THESE MADE IT FOR YOU TO DO YOUR WORK, TAKE CARE OF THINGS AT HOME, OR GET ALONG WITH OTHER PEOPLE?: NOT DIFFICULT AT ALL
GAD7 TOTAL SCORE: 0
GAD7 TOTAL SCORE: 0
IF YOU CHECKED OFF ANY PROBLEMS ON THIS QUESTIONNAIRE, HOW DIFFICULT HAVE THESE PROBLEMS MADE IT FOR YOU TO DO YOUR WORK, TAKE CARE OF THINGS AT HOME, OR GET ALONG WITH OTHER PEOPLE: NOT DIFFICULT AT ALL
5. BEING SO RESTLESS THAT IT IS HARD TO SIT STILL: NOT AT ALL
4. TROUBLE RELAXING: NOT AT ALL
7. FEELING AFRAID AS IF SOMETHING AWFUL MIGHT HAPPEN: NOT AT ALL
7. FEELING AFRAID AS IF SOMETHING AWFUL MIGHT HAPPEN: NOT AT ALL

## 2023-05-01 ASSESSMENT — PAIN SCALES - GENERAL: PAINLEVEL: NO PAIN (0)

## 2023-05-01 NOTE — PROGRESS NOTES
SUBJECTIVE:   CC: Yolanda is an 42 year old who presents for preventive health visit.       5/1/2023     7:40 AM   Additional Questions   Roomed by Joceline WESLEY CMA   Patient has been advised of split billing requirements and indicates understanding: Yes  Healthy Habits:     Getting at least 3 servings of Calcium per day:  Yes    Bi-annual eye exam:  NO    Dental care twice a year:  Yes    Sleep apnea or symptoms of sleep apnea:  Sleep apnea    Diet:  Regular (no restrictions)    Frequency of exercise:  None    Duration of exercise:  N/A    Taking medications regularly:  Yes    Barriers to taking medications:  None    Medication side effects:  None    PHQ-2 Total Score: 0    Additional concerns today:  Yes  History of Present Illness       Reason for visit:  Annual exam    She eats 2-3 servings of fruits and vegetables daily.She consumes 1 sweetened beverage(s) daily.She exercises with enough effort to increase her heart rate 9 or less minutes per day.  She exercises with enough effort to increase her heart rate 3 or less days per week.   She is not taking prescribed medications regularly due to None.    Today's PHQ-9         PHQ-9 Total Score: 0    PHQ-9 Q9 Thoughts of better off dead/self-harm past 2 weeks :   Not at all    How difficult have these problems made it for you to do your work, take care of things at home, or get along with other people: Not difficult at all  Today's SUSANNAH-7 Score: 0    -Few lesions in the genitals that she has had for a few years.  Wondering if they are genital warts and what she can do for them.  Asymptomatic. 18-20 years ago had genital warts and got a cream and went away    -Has random lesions throughout her body, thinks they are skin tags but wants to know for sure.     hypertension   On hydrochlorothiazide, lisinopril  BP Readings from Last 6 Encounters:   05/01/23 124/88   04/12/22 138/76   04/08/21 126/82   01/10/20 124/74   10/29/19 118/78   09/19/19 (!) 142/94          Today's  PHQ-2 Score:       2023    12:30 PM   PHQ-2 (  [employer])   Q1: Little interest or pleasure in doing things 0   Q2: Feeling down, depressed or hopeless 0   PHQ-2 Score 0   Q1: Little interest or pleasure in doing things Not at all   Q2: Feeling down, depressed or hopeless Not at all   PHQ-2 Score 0         Social History     Tobacco Use     Smoking status: Never     Smokeless tobacco: Never   Vaping Use     Vaping status: Never Used   Substance Use Topics     Alcohol use: Yes     Comment: Seth             2023    12:30 PM   Alcohol Use   Prescreen: >3 drinks/day or >7 drinks/week? No     Reviewed orders with patient.  Reviewed health maintenance and updated orders accordingly - Yes  BP Readings from Last 3 Encounters:   23 124/88   22 138/76   21 126/82    Wt Readings from Last 3 Encounters:   23 86.5 kg (190 lb 9.6 oz)   22 88 kg (194 lb)   22 86.2 kg (190 lb)                    Breast Cancer Screenin/8/2021     7:38 AM   Breast CA Risk Assessment (FHS-7)   Do you have a family history of breast, colon, or ovarian cancer? No / Unknown         Mammogram Screening - Offered annual screening and updated Health Maintenance for mutual plan based on risk factor consideration    Pertinent mammograms are reviewed under the imaging tab.    History of abnormal Pap smear: NO - age 30-65 PAP every 5 years with negative HPV co-testing recommended      Latest Ref Rng & Units 2022     8:04 AM 2017     9:49 AM 2017     8:58 AM   PAP / HPV   PAP  Negative for Intraepithelial Lesion or Malignancy (NILM)       PAP (Historical)    NIL     HPV 16 DNA Negative Negative   Negative      HPV 18 DNA Negative Negative   Negative      Other HR HPV Negative Negative   Negative        Reviewed and updated as needed this visit by clinical staff   Tobacco  Allergies  Meds              Reviewed and updated as needed this visit by Provider                 Past  "Medical History:   Diagnosis Date     Anxiety      ASCUS favor benign 2014    Neg HPV - cotest in 3 yrs     Chickenpox      Depressive disorder      Dysplasia of cervix, unspecified age 17    [facility]     Human papillomavirus in conditions classified elsewhere and of unspecified site     age 18     Hypertension      Irritable bowel syndrome 2005     Major depressive disorder, recurrent episode, moderate (H)     major depressive affective disorder, recurrent episodesEvaristo, Dr. MER Damon, psychiatrist.       Past Surgical History:   Procedure Laterality Date      SECTION  2013    Procedure:  SECTION;   section;  Surgeon: Nimco Powell MD;  Location: [facility]      SECTION, TUBAL LIGATION, COMBINED N/A 2015    Procedure: COMBINED  SECTION, TUBAL LIGATION;  Surgeon: Karon Beckett MD;  Location: [facility]     COLONOSCOPY       MOUTH SURGERY      wisdom teeth     OB History    Para Term  AB Living   2 2 2 0 0 2   SAB IAB Ectopic Multiple Live Births   0 0 0 0 2      # Outcome Date GA Lbr Jesus/2nd Weight Sex Delivery Anes PTL Lv   2 Term 02/06/15 37w6d  3.657 kg (8 lb 1 oz) F CS-LTranv Spinal  SAM      Apgar1: 7  Apgar5: 8   1 Term 13 39w0d  3.289 kg (7 lb 4 oz) M CS-LTranv EPI N SAM      Name: CHIDI MYRICK      Apgar1: 3  Apgar5: 7       Review of Systems  ROS: 5 point ROS negative except as noted above in HPI, including Gen., Resp., CV, GI &  system review.      OBJECTIVE:   /88 (BP Location: Right arm, Patient Position: Sitting, Cuff Size: Adult Large)   Pulse 74   Temp 98  F (36.7  C) (Tympanic)   Resp 20   Ht 1.562 m (5' 1.5\")   Wt 86.5 kg (190 lb 9.6 oz)   LMP  (LMP Unknown)   SpO2 100%   BMI 35.43 kg/m    Physical Exam  GENERAL: healthy, alert and no distress  EYES: Eyes grossly normal to inspection, PERRL and conjunctivae and sclerae normal  HENT: ear canals and " TM's normal, nose and mouth without ulcers or lesions  NECK: no adenopathy, no asymmetry, masses, or scars and thyroid normal to palpation  RESP: lungs clear to auscultation - no rales, rhonchi or wheezes  BREAST: normal without masses, tenderness or nipple discharge and no palpable axillary masses or adenopathy  CV: regular rate and rhythm, normal S1 S2, no S3 or S4, no murmur, click or rub, no peripheral edema and peripheral pulses strong  ABDOMEN: soft, nontender, no hepatosplenomegaly, no masses and bowel sounds normal   (female): normal female external genitalia except at clitoral harris are three small pointed papules  MS: no gross musculoskeletal defects noted, no edema  SKIN: multiple hyperpigmented seborrheic keratosis across back  NEURO: Normal strength and tone, mentation intact and speech normal  PSYCH: mentation appears normal, affect normal/bright      ASSESSMENT/PLAN:   Yolanda was seen today for physical.    Diagnoses and all orders for this visit:    Routine general medical examination at a health care facility    Benign essential hypertension  Controlled  -     hydrochlorothiazide (HYDRODIURIL) 25 MG tablet; Take 1 tablet (25 mg) by mouth every morning  -     lisinopril (ZESTRIL) 5 MG tablet; Take 1 tablet (5 mg) by mouth daily  -     Comprehensive metabolic panel (BMP + Alb, Alk Phos, ALT, AST, Total. Bili, TP); Future  -     CBC with platelets and differential; Future    CARDIOVASCULAR SCREENING; LDL GOAL LESS THAN 160  -     Lipid panel reflex to direct LDL Fasting; Future    Genital warts  Most likely  Try aldara  If don't resolve, can come back in and I will remove and send in for pathology  -     imiquimod (ALDARA) 5 % external cream; Apply a small sized amount to warts or molluscum three times weekly at bedtime.   Wash off after 8 hours.   May use for up to 16 weeks.    Morbid obesity (H)  -     TSH with free T4 reflex; Future    Seborrheic keratoses  Can come back in for cryotherapy if  desired    Other orders  -     REVIEW OF HEALTH MAINTENANCE PROTOCOL ORDERS        Patient has been advised of split billing requirements and indicates understanding: Yes      COUNSELING:  Reviewed preventive health counseling, as reflected in patient instructions  Special attention given to:        Regular exercise       Healthy diet/nutrition       Contraception        She reports that she has never smoked. She has never used smokeless tobacco.      Karon Barrett MD  [facility]

## 2023-05-03 LAB — HBA1C MFR BLD: 5.4 % (ref 0–5.6)

## 2023-09-28 ENCOUNTER — VIRTUAL VISIT (OUTPATIENT)
Dept: PULMONOLOGY | Facility: OTHER | Age: 43
End: 2023-09-28
Attending: FAMILY MEDICINE
Payer: COMMERCIAL

## 2023-09-28 VITALS — WEIGHT: 180 LBS | BODY MASS INDEX: 33.13 KG/M2 | HEIGHT: 62 IN

## 2023-09-28 DIAGNOSIS — G47.33 OSA (OBSTRUCTIVE SLEEP APNEA): Primary | ICD-10-CM

## 2023-09-28 PROCEDURE — 99213 OFFICE O/P EST LOW 20 MIN: CPT | Mod: VID | Performed by: FAMILY MEDICINE

## 2023-09-28 ASSESSMENT — PAIN SCALES - GENERAL: PAINLEVEL: NO PAIN (0)

## 2023-09-28 NOTE — PROGRESS NOTES
"  Virtual Visit Details    Type of service:  Video Visit       Yolanda Barnes is a 42 year old female who is being evaluated via a billable video visit.       The patient has been notified of following:      \"This video visit will be conducted via a call between you and your physician/provider. We have found that certain health care needs can be provided without the need for an in-person physical exam.  This service lets us provide the care you need with a video conversation.  If a prescription is necessary we can send it directly to your pharmacy.  If lab work is needed we can place an order for that and you can then stop by our lab to have the test done at a later time.     Video visits are billed at different rates depending on your insurance coverage.  Please reach out to your insurance provider with any questions.     If during the course of the call the physician/provider feels a video visit is not appropriate, you will not be charged for this service.\"     Patient has given verbal consent for Video visit? Yes  How would you like to obtain your AVS? Mail a copy  If you are dropped from the video visit, the video invite should be resent to: Text to cell phone: -  Will anyone else be joining your video visit? No  If patient encounters technical issues they should call [phone]      Video-Visit Details     Type of service:  Video Visit     Start Time:  3pm  End Time:  3:15pm    Originating Location (pt. Location): Home     Distant Location (provider location):  Off-site, [facility] [place]       Platform used for Video Visit: Artillery    Virtual visit for annual follow-up of severe GEOVANI managed with CPAP.     Assessment:  - Severe GEOVANI with significant sleep-associated hypoxemia  - Appears well controlled with excellent compliance on CPAP auto-titrate 5-15 cm H2O     Plan:  - Continue CPAP auto-titrate 5-15 cm H2O.  - Recommended and discussed weight management.  - Plan for follow-up in " "1-2 years    SUBJECTIVE:  Yolanda Barnes is a 42 year old female.    Pertinent PMHx of GEOVANI, anxiety, SUSANNAH, MDD, HTN, obesity.     Prior Sleep Testin2019 - HST with weight 182 lbs, BMI 34.3.  AHI 94.2.  sustained hypoxemia was not present. Baseline oxygen saturation was 93.8%.  Time with saturation less than or equal to 88% was 173 minutes. The lowest oxygen saturation was 73%.      10/29/2019 - Initial consult, high clinical concern for GEOVANI.  HST ordered.     2019 - Set up on CPAP auto-titrate 5-15 cm H2O.     3/28/2022 -she presents today and feels that she is doing incredibly well with her CPAP.  She feels that it is a \"life changer\".  She is sleeping well, feels very well rested, feels that she is actually breathing while sleeping.  She has no concerns today.     CPAP download reviewed over past 30 days on auto-titrate 5-15 cm H2O.  Used 30/30 days, average daily usage 483 minutes.  Pressure 95th%ile 13.6 cm H2O.  AHI 0.2.  Resmed device.    A/P to continue CPAP auto 5-15.    Today -she presents today primarily to get an updated CPAP supply prescription.  She otherwise feels the CPAP continues to work incredibly well and feels well rested.  She denies any residual snoring or observed apnea.    Reviewed CPAP download over past 30 days on auto-titrate 5-15 cm H2O.  Used 30/30 days, average usage 465 minutes.  AHI 0.3.    Past medical history:    Patient Active Problem List    Diagnosis Date Noted    GEOVANI (obstructive sleep apnea) 2019     Priority: Medium     Severe GEOVANI with sleep-associated hypoxemia.    HST on 2019 with weight 182 lbs, analysis time 575.9 minutes.  AHI 94.2, baseline SpO2 93.8%, selina SpO2 73%, <= 88% for 173 minutes.  No clear sustained hypoxemia.  61% of recording supine, no clear positional component.      Obesity (BMI 35.0-39.9) with comorbidity (H) 2019     Priority: Medium    SUSANNAH (generalized anxiety disorder) 2018     Priority: Medium    Benign " essential hypertension 08/02/2018     Priority: Medium    Acne 03/04/2013     Priority: Medium    CARDIOVASCULAR SCREENING; LDL GOAL LESS THAN 160 10/31/2010     Priority: Medium       10 point ROS of systems including Constitutional, Eyes, Respiratory, Cardiovascular, Gastroenterology, Genitourinary, Integumentary, Muscularskeletal, Psychiatric were all negative except for pertinent positives noted in my HPI.    Current Outpatient Medications   Medication Sig Dispense Refill    hydrochlorothiazide (HYDRODIURIL) 25 MG tablet Take 1 tablet (25 mg) by mouth every morning 90 tablet 3    imiquimod (ALDARA) 5 % external cream Apply a small sized amount to warts or molluscum three times weekly at bedtime.   Wash off after 8 hours.   May use for up to 16 weeks. 12 packet 3    lisinopril (ZESTRIL) 5 MG tablet Take 1 tablet (5 mg) by mouth daily 90 tablet 3       OBJECTIVE:  There were no vitals taken for this visit.    Physical Exam     ---  This note was written with the assistance of the Dragon voice-dictation technology software. The final document, although reviewed, may contain errors. For corrections, please contact the office.    Total time spent preparing to see the patient, review of chart, obtaining history and physical examination, review of sleep testing, review of treatment options, education, discussion with patient and documenting in Epic / EMR was 20 minutes.  All time involved was spent on the day of service for the patient (the same day as the patient's appointment).    Rodolfo Zuleta MD    Sleep Medicine  [place], [place]  Main Office: [phone]  [facility] [place]  [address] [place] [address], [id number]  Schedule visits: [phone]  Main Office: [phone]  Fax: [phone]

## 2023-09-28 NOTE — NURSING NOTE
Has patient had flu shot for current/most recent flu season? If so, when? No - not yet, will be taking at work      Is the patient currently in the state of [place]? YES    Visit mode:VIDEO    If the visit is dropped, the patient can be reconnected by: VIDEO VISIT: Text to cell phone:   Telephone Information:   Mobile [phone]       Will anyone else be joining the visit? NO  (If patient encounters technical issues they should call [phone]    How would you like to obtain your AVS? MyChart    Are changes needed to the allergy or medication list? No    Reason for visit: RECHECK    Smiley VARNER

## 2023-09-28 NOTE — PROGRESS NOTES
CPAP Compliance Visit:       Name: Yolanda Barnes MRN# [id number]   Age: 42 year old YOB: 1980     Date of Consultation: September 28, 2023  Primary care provider: Karon Rehman    Compliance:   100 % of days with >4 hours of use.   0days/30 with no use   Average use: 7hours 43minutes per day   95%ile leak 5.9 L/min   CPAP 95% pressure 10.6 cm   AHI 0.3 events/hour   MIGUEL 0  PB 0%     Impression:   Patient is compliant with CPAP therapy and using CPAP with no issues. Continue to use CPAP and follow up with sleep medicine as necessary.

## 2024-01-10 ENCOUNTER — OFFICE VISIT (OUTPATIENT)
Dept: FAMILY MEDICINE | Facility: CLINIC | Age: 44
End: 2024-01-10
Payer: COMMERCIAL

## 2024-01-10 VITALS
TEMPERATURE: 98.8 F | OXYGEN SATURATION: 100 % | DIASTOLIC BLOOD PRESSURE: 78 MMHG | SYSTOLIC BLOOD PRESSURE: 122 MMHG | WEIGHT: 189.2 LBS | HEART RATE: 85 BPM | BODY MASS INDEX: 34.82 KG/M2 | RESPIRATION RATE: 22 BRPM | HEIGHT: 62 IN

## 2024-01-10 DIAGNOSIS — E66.01 MORBID OBESITY (H): ICD-10-CM

## 2024-01-10 DIAGNOSIS — R10.33 UMBILICAL PAIN: Primary | ICD-10-CM

## 2024-01-10 PROCEDURE — 99213 OFFICE O/P EST LOW 20 MIN: CPT | Performed by: FAMILY MEDICINE

## 2024-01-10 ASSESSMENT — PAIN SCALES - GENERAL: PAINLEVEL: NO PAIN (0)

## 2024-01-10 ASSESSMENT — PATIENT HEALTH QUESTIONNAIRE - PHQ9
SUM OF ALL RESPONSES TO PHQ QUESTIONS 1-9: 2
10. IF YOU CHECKED OFF ANY PROBLEMS, HOW DIFFICULT HAVE THESE PROBLEMS MADE IT FOR YOU TO DO YOUR WORK, TAKE CARE OF THINGS AT HOME, OR GET ALONG WITH OTHER PEOPLE: NOT DIFFICULT AT ALL
SUM OF ALL RESPONSES TO PHQ QUESTIONS 1-9: 2

## 2024-01-10 NOTE — PROGRESS NOTES
"  Assessment & Plan     Umbilical pain  Differential discussed in detail and suspect symptoms secondary to umbilical hernia.  Ultrasound abdomen ordered and general surgery referral placed  - US Abdomen Limited; Future  - Adult General Surg Referral; Future    Morbid obesity (H)  Recommended regular exercise, healthy diet and weight loss      BMI:   Estimated body mass index is 35.17 kg/m  as calculated from the following:    Height as of this encounter: 1.562 m (5' 1.5\").    Weight as of this encounter: 85.8 kg (189 lb 3.2 oz).   Weight management plan: Discussed healthy diet and exercise guidelines        Gasper Araya MD  [facility]    Carlos Guerrero is a 43 year old, presenting for the following health issues:  Hernia        1/10/2024     7:11 AM   Additional Questions   Roomed by Joceline WESLEY CMA       History of Present Illness       Reason for visit:  I believe I might have a hernia near my belly button  Symptom onset:  More than a month  Symptoms include:  Tenderness and the feeling of something popping out  Symptom intensity:  Mild  Symptom progression:  Improving  Had these symptoms before:  Yes  Has tried/received treatment for these symptoms:  No  What makes it worse:  Strenuous ab muscle activity    She eats 2-3 servings of fruits and vegetables daily.She consumes 1 sweetened beverage(s) daily.She exercises with enough effort to increase her heart rate 9 or less minutes per day.  She exercises with enough effort to increase her heart rate 3 or less days per week. She is missing 1 dose(s) of medications per week.  She is not taking prescribed medications regularly due to remembering to take.         Review of Systems   Constitutional, HEENT, cardiovascular, pulmonary, gi and gu systems are negative, except as otherwise noted.      Objective    /78 (BP Location: Right arm, Patient Position: Sitting, Cuff Size: Adult Regular)   Pulse 85   Temp 98.8  F (37.1  C) " "(Tympanic)   Resp 22   Ht 1.562 m (5' 1.5\")   Wt 85.8 kg (189 lb 3.2 oz)   LMP  (LMP Unknown)   SpO2 100%   BMI 35.17 kg/m    Body mass index is 35.17 kg/m .  Physical Exam   GENERAL: alert and no distress  EYES: Eyes grossly normal to inspection, PERRL and conjunctivae and sclerae normal  HENT: normal cephalic/atraumatic, nose and mouth without ulcers or lesions, oropharynx clear, and oral mucous membranes moist  NECK: no adenopathy, no asymmetry, masses, or scars and thyroid normal to palpation  RESP: lungs clear to auscultation - no rales, rhonchi or wheezes  CV: regular rates and rhythm, normal S1 S2, no S3 or S4, and no murmur, click or rub  ABDOMEN: Soft, nontender, small umbilical hernia without any tenderness or skin discoloration  MS: no gross musculoskeletal defects noted, no edema  NEURO: Normal strength and tone, mentation intact and speech normal  PSYCH: mentation appears normal, affect normal/bright                  "

## 2024-01-20 ENCOUNTER — HOSPITAL ENCOUNTER (OUTPATIENT)
Dept: ULTRASOUND IMAGING | Facility: CLINIC | Age: 44
Discharge: HOME | End: 2024-01-20
Attending: FAMILY MEDICINE | Admitting: FAMILY MEDICINE
Payer: COMMERCIAL

## 2024-01-20 DIAGNOSIS — R10.33 UMBILICAL PAIN: ICD-10-CM

## 2024-01-20 PROCEDURE — 76705 ECHO EXAM OF ABDOMEN: CPT

## 2024-01-23 ENCOUNTER — OFFICE VISIT (OUTPATIENT)
Dept: SURGERY | Facility: CLINIC | Age: 44
End: 2024-01-23
Attending: FAMILY MEDICINE
Payer: COMMERCIAL

## 2024-01-23 ENCOUNTER — MYC MEDICAL ADVICE (OUTPATIENT)
Dept: SURGERY | Facility: CLINIC | Age: 44
End: 2024-01-23

## 2024-01-23 VITALS
BODY MASS INDEX: 36.1 KG/M2 | TEMPERATURE: 99.4 F | HEIGHT: 61 IN | WEIGHT: 191.2 LBS | HEART RATE: 91 BPM | DIASTOLIC BLOOD PRESSURE: 83 MMHG | SYSTOLIC BLOOD PRESSURE: 147 MMHG

## 2024-01-23 DIAGNOSIS — E66.01 MORBID OBESITY (H): Chronic | ICD-10-CM

## 2024-01-23 DIAGNOSIS — R10.33 UMBILICAL PAIN: Primary | ICD-10-CM

## 2024-01-23 PROCEDURE — 99244 OFF/OP CNSLTJ NEW/EST MOD 40: CPT | Performed by: SURGERY

## 2024-01-23 ASSESSMENT — PAIN SCALES - GENERAL: PAINLEVEL: NO PAIN (0)

## 2024-01-23 NOTE — PROGRESS NOTES
Surgical Consultation/History and Physical  [facility] General Surgery    Yolanda is seen in consultation for Hernia, at the request of Karon Barrett MD.    Chief Complaint: Hernia    History of Present Illness: Yolanda Barnes is a 43 year old female presents with hernia.  She notes discomfort at umbilicus for several years.  She states it was worse over the holiday with increased firmness and tenderness at the site.  She did feel a bulge which has since resolved.  She denies history of obstipation, nausea, vomiting during episode.  No history of hernias or surgery at that site.  Worse with activities, particularly snowmobiling.    Patient Active Problem List   Diagnosis    CARDIOVASCULAR SCREENING; LDL GOAL LESS THAN 160    Acne    SUSANNAH (generalized anxiety disorder)    Benign essential hypertension    Obesity (BMI 35.0-39.9) with comorbidity (H)    GEOVANI (obstructive sleep apnea)     Past Medical History:   Diagnosis Date    Anxiety     ASCUS favor benign 2014    Neg HPV - cotest in 3 yrs    Chickenpox     Depressive disorder     Dysplasia of cervix, unspecified age 17    [facility]    Human papillomavirus in conditions classified elsewhere and of unspecified site     age 18    Hypertension     Irritable bowel syndrome 2005    Major depressive disorder, recurrent episode, moderate (H)     major depressive affective disorder, recurrent episodesDr. MER Loera, psychiatrist.      Past Surgical History:   Procedure Laterality Date     SECTION  2013    Procedure:  SECTION;   section;  Surgeon: Nimco Powell MD;  Location: [facility]     SECTION, TUBAL LIGATION, COMBINED N/A 2015    Procedure: COMBINED  SECTION, TUBAL LIGATION;  Surgeon: Karon Beckett MD;  Location: [facility]    COLONOSCOPY      MOUTH SURGERY      wisdom teeth     Family History   Problem Relation Age of Onset    Depression Mother      "Alcohol/Drug Mother         drugs    Anxiety Disorder Mother     Diabetes Father     Depression Father     Eye Disorder Father     Alcohol/Drug Father         drugs    Alzheimer Disease Maternal Grandfather     Heart Disease Paternal Grandmother     Cerebrovascular Disease Paternal Grandfather     Eye Disorder Sister         corrected with lasik      Social History     Tobacco Use    Smoking status: Never    Smokeless tobacco: Never   Substance Use Topics    Alcohol use: Yes     Comment: Ocassionaly      History   Drug Use No     Current Outpatient Medications   Medication Sig Dispense Refill    hydrochlorothiazide (HYDRODIURIL) 25 MG tablet Take 1 tablet (25 mg) by mouth every morning 90 tablet 3    lisinopril (ZESTRIL) 5 MG tablet Take 1 tablet (5 mg) by mouth daily 90 tablet 3    imiquimod (ALDARA) 5 % external cream Apply a small sized amount to warts or molluscum three times weekly at bedtime.   Wash off after 8 hours.   May use for up to 16 weeks. (Patient not taking: Reported on 1/10/2024) 12 packet 3     No Known Allergies    Review of Systems:   5 point ROS otherwise negative    Physical Exam:  BP (!) 147/83 (BP Location: Right arm, Patient Position: Sitting, Cuff Size: Adult Regular)   Pulse 91   Temp 99.4  F (37.4  C) (Tympanic)   Ht 1.562 m (5' 1.5\")   Wt 86.7 kg (191 lb 3.2 oz)   LMP  (LMP Unknown)   BMI 35.55 kg/m      Constitutional- No acute distress, well nourished, non-toxic  Eyes: Anicteric, no injection.  PERRL  ENT:  Normocephalic, atraumatic, Nose midline, moist mucus membranes  Neck - supple, no LAD  Respiratory- Good inspiratory effort  Cardiovascular - .  No peripheral edema.  No clubbing.  Abdomen - Soft, non-tender, +BS, no hepatosplenomegaly, partially reducible umbilical hernia.  No overlying skin changes  Neuro - No focal neuro deficits, Alert and oriented x 3  Psych: Appropriate mood and affect  Musculoskeletal: Normal gait, symmetric strength.  FROM upper and lower " extremities.  Skin: Warm, Dry    US hernia:  Narrative & Impression   EXAM: US HERNIA EVALUATION  LOCATION: [facility]  DATE: 1/20/2024     INDICATION:  Umbilical pain  COMPARISON: None.     TECHNIQUE: Transabdominal ultrasound evaluation of the umbilicus.     FINDINGS: A fat-containing umbilical hernia measuring 1.4 x 0.7 cm at the neck. The hernia did not fully reduce during the examination.                                                                      IMPRESSION:  1.  A fat-containing umbilical hernia.     Assessment:  1. Umbilical pain    2. Obesity (BMI 35.0-39.9) with comorbidity (H)      Plan:   Yolanda Barnes presents with symptomatic umbilical hernia. Symptoms  are  stable at present with recent exacerbaton. The patient may benefit from robotic umbilical hernia repair with mesh and the indications, risks, benefits and alternatives to surgery were discussed in detail, and the patient understood the counseling offered and wishes to proceed as planned and outlined. Risks specifically discussed include bleeding, infection, seroma, need for additional treatment, chronic pain, mesh complications (erosion, infection), nontherapeutic intervention, recurrent hernia, potential need for mesh, potential need for temporary surgical drain, wound complication (such as dehiscence), and rare complications related to surgery and/or anesthesia such as venous thromboembolism and cardiorespiratory complications.    We discussed outcomes related to hernia repair in setting of obesity.  Patient states she wishes to lose weight and will use this as motivation to do so.  I discussed 15-20lbs of weight loss would be ideal to reduce chances of hernia recurrence.    DAFNE guide reviewed.  All questions answered.  Signs/symptoms strangulation/incarceration discussed.      RTC in 3 months for hopeful surgical planning.  Patient to keep weight record at home.  Advised of modest weight loss with  modest exercise and focus on balanced diet/portion sizes.      Sherwin Carey, DO on 1/23/2024 at 7:12 AM

## 2024-01-23 NOTE — LETTER
2024         RE: Yolanda Barnes  [address] [facility] [address]        Dear Colleague,    Thank you for referring your patient, Yolanda Barnes, to the [facility]. Please see a copy of my visit note below.    Surgical Consultation/History and Physical  [facility]    Yolanda is seen in consultation for Hernia, at the request of Karon Barrett MD.    Chief Complaint: Hernia    History of Present Illness: Yolanda Barnes is a 43 year old female presents with hernia.  She notes discomfort at umbilicus for several years.  She states it was worse over the holiday with increased firmness and tenderness at the site.  She did feel a bulge which has since resolved.  She denies history of obstipation, nausea, vomiting during episode.  No history of hernias or surgery at that site.  Worse with activities, particularly snowmobiling.    Patient Active Problem List   Diagnosis     CARDIOVASCULAR SCREENING; LDL GOAL LESS THAN 160     Acne     SUSANNAH (generalized anxiety disorder)     Benign essential hypertension     Obesity (BMI 35.0-39.9) with comorbidity (H)     GEOVANI (obstructive sleep apnea)     Past Medical History:   Diagnosis Date     Anxiety      ASCUS favor benign 2014    Neg HPV - cotest in 3 yrs     Chickenpox      Depressive disorder      Dysplasia of cervix, unspecified age 17    [facility]     Human papillomavirus in conditions classified elsewhere and of unspecified site     age 18     Hypertension      Irritable bowel syndrome 2005     Major depressive disorder, recurrent episode, moderate (H)     major depressive affective disorder, recurrent episodesDr. MER Loera, psychiatrist.      Past Surgical History:   Procedure Laterality Date      SECTION  2013    Procedure:  SECTION;   section;  Surgeon: Nimco Powell MD;  Location: [facility]       "SECTION, TUBAL LIGATION, COMBINED N/A 2015    Procedure: COMBINED  SECTION, TUBAL LIGATION;  Surgeon: Karon Beckett MD;  Location: [facility]     COLONOSCOPY       MOUTH SURGERY      wisdom teeth     Family History   Problem Relation Age of Onset     Depression Mother      Alcohol/Drug Mother         drugs     Anxiety Disorder Mother      Diabetes Father      Depression Father      Eye Disorder Father      Alcohol/Drug Father         drugs     Alzheimer Disease Maternal Grandfather      Heart Disease Paternal Grandmother      Cerebrovascular Disease Paternal Grandfather      Eye Disorder Sister         corrected with lasik      Social History     Tobacco Use     Smoking status: Never     Smokeless tobacco: Never   Substance Use Topics     Alcohol use: Yes     Comment: Ocassionaly      History   Drug Use No     Current Outpatient Medications   Medication Sig Dispense Refill     hydrochlorothiazide (HYDRODIURIL) 25 MG tablet Take 1 tablet (25 mg) by mouth every morning 90 tablet 3     lisinopril (ZESTRIL) 5 MG tablet Take 1 tablet (5 mg) by mouth daily 90 tablet 3     imiquimod (ALDARA) 5 % external cream Apply a small sized amount to warts or molluscum three times weekly at bedtime.   Wash off after 8 hours.   May use for up to 16 weeks. (Patient not taking: Reported on 1/10/2024) 12 packet 3     No Known Allergies    Review of Systems:   5 point ROS otherwise negative    Physical Exam:  BP (!) 147/83 (BP Location: Right arm, Patient Position: Sitting, Cuff Size: Adult Regular)   Pulse 91   Temp 99.4  F (37.4  C) (Tympanic)   Ht 1.562 m (5' 1.5\")   Wt 86.7 kg (191 lb 3.2 oz)   LMP  (LMP Unknown)   BMI 35.55 kg/m      Constitutional- No acute distress, well nourished, non-toxic  Eyes: Anicteric, no injection.  PERRL  ENT:  Normocephalic, atraumatic, Nose midline, moist mucus membranes  Neck - supple, no LAD  Respiratory- Good inspiratory effort  Cardiovascular - .  No peripheral edema.  No " clubbing.  Abdomen - Soft, non-tender, +BS, no hepatosplenomegaly, partially reducible umbilical hernia.  No overlying skin changes  Neuro - No focal neuro deficits, Alert and oriented x 3  Psych: Appropriate mood and affect  Musculoskeletal: Normal gait, symmetric strength.  FROM upper and lower extremities.  Skin: Warm, Dry    US hernia:  Narrative & Impression   EXAM: US HERNIA EVALUATION  LOCATION: [facility]  DATE: 1/20/2024     INDICATION:  Umbilical pain  COMPARISON: None.     TECHNIQUE: Transabdominal ultrasound evaluation of the umbilicus.     FINDINGS: A fat-containing umbilical hernia measuring 1.4 x 0.7 cm at the neck. The hernia did not fully reduce during the examination.                                                                      IMPRESSION:  1.  A fat-containing umbilical hernia.     Assessment:  1. Umbilical pain    2. Obesity (BMI 35.0-39.9) with comorbidity (H)      Plan:   Yolanda Barnes presents with symptomatic umbilical hernia. Symptoms  are  stable at present with recent exacerbaton. The patient may benefit from robotic umbilical hernia repair with mesh and the indications, risks, benefits and alternatives to surgery were discussed in detail, and the patient understood the counseling offered and wishes to proceed as planned and outlined. Risks specifically discussed include bleeding, infection, seroma, need for additional treatment, chronic pain, mesh complications (erosion, infection), nontherapeutic intervention, recurrent hernia, potential need for mesh, potential need for temporary surgical drain, wound complication (such as dehiscence), and rare complications related to surgery and/or anesthesia such as venous thromboembolism and cardiorespiratory complications.    We discussed outcomes related to hernia repair in setting of obesity.  Patient states she wishes to lose weight and will use this as motivation to do so.  I discussed 15-20lbs of  weight loss would be ideal to reduce chances of hernia recurrence.    DAFNE guide reviewed.  All questions answered.  Signs/symptoms strangulation/incarceration discussed.      RTC in 3 months for hopeful surgical planning.  Patient to keep weight record at home.  Advised of modest weight loss with modest exercise and focus on balanced diet/portion sizes.      Sherwin Carey, DO on 1/23/2024 at 7:12 AM      Again, thank you for allowing me to participate in the care of your patient.        Sincerely,        Sherwin Carey, DO

## 2024-01-23 NOTE — NURSING NOTE
"Initial BP (!) 147/83 (BP Location: Right arm, Patient Position: Sitting, Cuff Size: Adult Regular)   Pulse 91   Temp 99.4  F (37.4  C) (Tympanic)   Ht 1.562 m (5' 1.5\")   Wt 86.7 kg (191 lb 3.2 oz)   LMP  (LMP Unknown)   BMI 35.55 kg/m   Estimated body mass index is 35.55 kg/m  as calculated from the following:    Height as of this encounter: 1.562 m (5' 1.5\").    Weight as of this encounter: 86.7 kg (191 lb 3.2 oz). .  Itzel Llanos MA    "

## 2024-04-18 ENCOUNTER — VIRTUAL VISIT (OUTPATIENT)
Dept: SURGERY | Facility: CLINIC | Age: 44
End: 2024-04-18
Payer: COMMERCIAL

## 2024-04-18 DIAGNOSIS — R10.33 UMBILICAL PAIN: Primary | ICD-10-CM

## 2024-04-18 PROCEDURE — 99441 PR PHYSICIAN TELEPHONE EVALUATION 5-10 MIN: CPT | Mod: 93 | Performed by: SURGERY

## 2024-04-18 NOTE — LETTER
4/18/2024         RE: Yolanda Barnes  [address]  [facility] [address]        Dear Colleague,    Thank you for referring your patient, Yolanda Barnes, to the [facility]. Please see a copy of my visit note below.    Total time on phone: 8 minutes    Patient reports intermittent, minimal symptoms with her umbilical hernia.  Denies nausea, vomiting, obstipation.  She has lost some weight (approximately 5 lbs) (calculated BMI 32 with weight of 182#).  She desires repair, however wants to time this appropriately for work/upcoming vacations.      Advised this is appropriate.  Discussed signs/symptoms of obstruction.      Will plan robotic hernia repair with mesh in the fall.    RTC 9/2024 for phone visit to discuss surgical planning.  Advised to continue modest, healthy weight loss in interim.    Sherwin Carey,  on 4/18/2024 at 2:17 PM      Again, thank you for allowing me to participate in the care of your patient.        Sincerely,        Sherwin Carey DO

## 2024-04-18 NOTE — PROGRESS NOTES
Total time on phone: 8 minutes    Patient reports intermittent, minimal symptoms with her umbilical hernia.  Denies nausea, vomiting, obstipation.  She has lost some weight (approximately 5 lbs) (calculated BMI 32 with weight of 182#).  She desires repair, however wants to time this appropriately for work/upcoming vacations.      Advised this is appropriate.  Discussed signs/symptoms of obstruction.      Will plan robotic hernia repair with mesh in the fall.    RTC 9/2024 for phone visit to discuss surgical planning.  Advised to continue modest, healthy weight loss in interim.    Sherwin Carey, DO on 4/18/2024 at 2:17 PM

## 2024-04-18 NOTE — PROGRESS NOTES
"Yolanda is a 43 year old who is being evaluated via a billable telephone visit.    What phone number would you like to be contacted at? [phone]  How would you like to obtain your AVS? Dayannahart  Originating Location (pt. Location): {patient location:438236::\"Home\"}  {PROVIDER LOCATION On-site should be selected for visits conducted from your clinic location or adjoining [facility] hospital, academic office, or other nearby [facility] building. Off-site should be selected for all other provider locations, including home:489045}  Distant Location (provider location):  {virtual location provider:534204}    {PROVIDER CHARTING PREFERENCE:714206}    Subjective   Yolanda is a 43 year old, presenting for the following health issues:  No chief complaint on file.    HPI     ***    {ROS Picklists (Optional):342667}      Objective           Vitals:  No vitals were obtained today due to virtual visit.    Physical Exam   General: Alert and no distress //Respiratory: No audible wheeze, cough, or shortness of breath // Psychiatric:  Appropriate affect, tone, and pace of words      {Diagnostic Test Results (Optional):314151}      Phone call duration: *** minutes  Signed Electronically by: Sherwin Carey DO  {Email feedback regarding this note to [email or web address]   :676193}   "

## 2024-06-07 DIAGNOSIS — I10 BENIGN ESSENTIAL HYPERTENSION: ICD-10-CM

## 2024-06-07 RX ORDER — LISINOPRIL 5 MG/1
5 TABLET ORAL DAILY
Qty: 90 TABLET | Refills: 3 | Status: SHIPPED | OUTPATIENT
Start: 2024-06-07

## 2024-06-07 RX ORDER — HYDROCHLOROTHIAZIDE 25 MG/1
25 TABLET ORAL EVERY MORNING
Qty: 90 TABLET | Refills: 3 | Status: SHIPPED | OUTPATIENT
Start: 2024-06-07

## 2024-06-16 ENCOUNTER — HEALTH MAINTENANCE LETTER (OUTPATIENT)
Age: 44
End: 2024-06-16

## 2024-08-19 ENCOUNTER — E-VISIT (OUTPATIENT)
Dept: URGENT CARE | Facility: CLINIC | Age: 44
End: 2024-08-19
Payer: COMMERCIAL

## 2024-08-19 ENCOUNTER — OFFICE VISIT (OUTPATIENT)
Dept: URGENT CARE | Facility: URGENT CARE | Age: 44
End: 2024-08-19
Payer: COMMERCIAL

## 2024-08-19 VITALS
OXYGEN SATURATION: 97 % | TEMPERATURE: 99.4 F | HEART RATE: 82 BPM | RESPIRATION RATE: 16 BRPM | SYSTOLIC BLOOD PRESSURE: 146 MMHG | DIASTOLIC BLOOD PRESSURE: 87 MMHG | BODY MASS INDEX: 34.21 KG/M2 | WEIGHT: 184 LBS

## 2024-08-19 DIAGNOSIS — R21 RASH: Primary | ICD-10-CM

## 2024-08-19 DIAGNOSIS — B02.9 HERPES ZOSTER WITHOUT COMPLICATION: Primary | ICD-10-CM

## 2024-08-19 PROCEDURE — 99213 OFFICE O/P EST LOW 20 MIN: CPT | Performed by: NURSE PRACTITIONER

## 2024-08-19 PROCEDURE — 99207 PR NON-BILLABLE SERV PER CHARTING: CPT | Performed by: NURSE PRACTITIONER

## 2024-08-19 RX ORDER — VALACYCLOVIR HYDROCHLORIDE 1 G/1
1000 TABLET, FILM COATED ORAL 3 TIMES DAILY
Qty: 21 TABLET | Refills: 0 | Status: SHIPPED | OUTPATIENT
Start: 2024-08-19 | End: 2024-08-26

## 2024-08-19 NOTE — PROGRESS NOTES
SUBJECTIVE:   Yolanda Barnes is a 43 year old female presenting with a chief complaint of   Chief Complaint   Patient presents with    Derm Problem     Rash, evaluate for possible shingles on right upper thigh/groin area x 6 days   Area started feeling painful 6 days ago, then rash presented.     Past Medical History:   Diagnosis Date    Anxiety     ASCUS favor benign 08/01/2014    Neg HPV - cotest in 3 yrs    Chickenpox     Depressive disorder     Dysplasia of cervix, unspecified age 17    [facility]    Human papillomavirus in conditions classified elsewhere and of unspecified site     age 18    Hypertension     Irritable bowel syndrome 01/01/2005    Major depressive disorder, recurrent episode, moderate (H)     major depressive affective disorder, recurrent episodesEvaristo, Dr. MER Damon, psychiatrist.      Family History   Problem Relation Age of Onset    Depression Mother     Alcohol/Drug Mother         drugs    Anxiety Disorder Mother     Diabetes Father     Depression Father     Eye Disorder Father     Alcohol/Drug Father         drugs    Alzheimer Disease Maternal Grandfather     Heart Disease Paternal Grandmother     Cerebrovascular Disease Paternal Grandfather     Eye Disorder Sister         corrected with lasik      Current Outpatient Medications   Medication Sig Dispense Refill    hydrochlorothiazide (HYDRODIURIL) 25 MG tablet TAKE 1 TABLET (25 MG) BY MOUTH EVERY MORNING 90 tablet 3    lisinopril (ZESTRIL) 5 MG tablet TAKE 1 TABLET (5 MG) BY MOUTH DAILY 90 tablet 3    imiquimod (ALDARA) 5 % external cream Apply a small sized amount to warts or molluscum three times weekly at bedtime.   Wash off after 8 hours.   May use for up to 16 weeks. (Patient not taking: Reported on 1/10/2024) 12 packet 3     Social History     Tobacco Use    Smoking status: Never    Smokeless tobacco: Never   Substance Use Topics    Alcohol use: Yes     Comment: Ocassionalcandelaria       OBJECTIVE  BP  (!) 146/87   Pulse 82   Temp 99.4  F (37.4  C) (Tympanic)   Resp 16   Wt 83.5 kg (184 lb)   LMP 08/01/2024 (Approximate)   SpO2 97%   BMI 34.21 kg/m      Physical Exam    Rash appears to be following L1-2 dermatome.    ASSESSMENT:  1. Herpes zoster without complication    - valACYclovir (VALTREX) 1000 mg tablet; Take 1 tablet (1,000 mg) by mouth 3 times daily for 7 days  Dispense: 21 tablet; Refill: 0      PLAN:  1. Take Valcyclovir 1000 mg three times daily for a total of 7 days.  2. For pain I recommend over the counter pain medications such as Tylenol or Ibuprofen. Take Tylenol up to 1000 mg every  6 hours or ibuprofen 600mg every 6 hours by mouth for pain.  Do not exceed 4000mg of acetaminophen or 2400mg of ibuprofen from any source in a 24 hour period.  Taking Tylenol and ibuprofen together may be helpful in reducing pain. Ibuprofen is not considered as safe for elderly patient; partially due to it being processed by the kidney's and also due to it's blood thinning properties. If you have been previously told to avoid this medication then I recommend taking Tylenol only.

## 2024-08-19 NOTE — PATIENT INSTRUCTIONS
Dear Yolanda Barnes,    We are sorry you are not feeling well. Based on the responses you provided, it is recommended that you be seen in-person in urgent care so we can better evaluate your symptoms. Please click here to find the nearest urgent care location to you.   You will not be charged for this Visit. Thank you for trusting us with your care.    TANNA EPSTEIN CNP

## 2024-08-19 NOTE — PATIENT INSTRUCTIONS
"1. Take Valcyclovir 1000 mg three times daily for a total of 7 days.  2. For pain I recommend over the counter pain medications such as Tylenol or Ibuprofen. Take Tylenol up to 1000 mg every  6 hours or ibuprofen 600mg every 6 hours by mouth for pain.  Do not exceed 4000mg of acetaminophen or 2400mg of ibuprofen from any source in a 24 hour period.  Taking Tylenol and ibuprofen together may be helpful in reducing pain. Ibuprofen is not considered as safe for elderly patient; partially due to it being processed by the kidney's and also due to it's blood thinning properties. If you have been previously told to avoid this medication then I recommend taking Tylenol only.       What is shingles? -- Shingles is a painful rash that is shaped like a band or a belt. Shingles can affect people of all ages, but it is most common in those older than 50. Another name for shingles is \"herpes zoster.\"    What causes shingles? -- Shingles is caused by the same virus that causes chickenpox. After someone has chickenpox, the virus sometimes hides out, \"asleep\" in the body. Years later, it can \"wake up\" and cause shingles. The first time a person is infected with that virus, he or she gets chickenpox, not shingles.    Is shingles contagious? -- Yes and no. It is NOT possible to \"catch\" shingles from someone who has the rash. But it is possible to \"catch\" the virus and then get sick with chickenpox. Shingles and chickenpox are caused by the same virus.  You probably will NOT catch the virus (or get chickenpox) if you:  ?Had chickenpox or shingles in the past  ?Had the chickenpox vaccine  ?Were born in the [place] before 1980 (most people born before 1980 have had chickenpox even if they don't remember it)  If you have never had chickenpox or the chickenpox vaccine, be careful around anyone with shingles. Do not touch their rash. If you do, you could get sick with chickenpox. In rare cases, people can even get chickenpox from just " "being near someone with shingles. This is most likely in people who cannot fight infections well (immunocompromised individuals)    What are the symptoms of shingles? -- At first, shingles causes weird sensations on your skin. You might feel itching, burning, pain, or tingling. Some people get a fever, feel sick, or get a headache. Within 1 to 2 days, a rash with blisters appears. Blisters most often appear in a band across the chest and back. They can show up on other parts of the body, too. The blisters cause pain that can be mild or severe.  Within 3 to 4 days, shingles blisters can become open sores or \"ulcers\". These ulcers can get infected. Within 7 to 10 days, the rash should scab over. By then, most people are no longer contagious.    Can shingles be serious? -- Yes. Shingles can be serious, but that is rare. About 1 out of 10 people with shingles will get something called \"postherpetic neuralgia,\" or \"PHN.\" People with PHN keep feeling pain or discomfort even after their rash goes away. This pain can last for months or even years. It can be so bad that it makes it hard to sleep, causes weight loss, and leads to depression.  Shingles can also cause:  ?Skin infections  ?Eye problems (if the rash is near the eye)  ?Ear problems (if the rash is near the ear)  ?Dangerous infections in people who have other health problems    Should I be treated? -- If you see your doctor or nurse within 3 days of when your symptoms start, yes. He or she should give you medicines to help you get rid of the virus. These medicines are called anti-virals. They can speed your recovery and reduce the chances that you will have shingles-related problems such as PHN.    Can the pain be treated? -- Some people can deal with their pain with non-prescription pain medicines, but most people need prescription medicines.  How should I take care of the rash? -- Keep the parts of your skin that have a rash clean and dry. Do not use creams or " gels unless your doctor or nurse says you should.    Can shingles be prevented? -- People can reduce their chances of getting shingles by getting the shingles vaccine. The vaccine can also make the symptoms of shingles milder if they do occur. Experts recommend that most people age 60 and older should get the shingles vaccine. But, some people who have problems with their immune system should not get the vaccine.

## 2024-10-04 DIAGNOSIS — G47.33 OSA (OBSTRUCTIVE SLEEP APNEA): Primary | ICD-10-CM

## 2024-10-28 ENCOUNTER — PATIENT OUTREACH (OUTPATIENT)
Dept: CARE COORDINATION | Facility: CLINIC | Age: 44
End: 2024-10-28
Payer: COMMERCIAL

## 2024-11-19 ENCOUNTER — PATIENT OUTREACH (OUTPATIENT)
Dept: CARE COORDINATION | Facility: CLINIC | Age: 44
End: 2024-11-19
Payer: COMMERCIAL

## 2025-01-07 ENCOUNTER — MYC MEDICAL ADVICE (OUTPATIENT)
Dept: FAMILY MEDICINE | Facility: CLINIC | Age: 45
End: 2025-01-07
Payer: COMMERCIAL

## 2025-01-07 DIAGNOSIS — F41.1 GAD (GENERALIZED ANXIETY DISORDER): Primary | Chronic | ICD-10-CM

## 2025-01-08 ENCOUNTER — TELEPHONE (OUTPATIENT)
Dept: FAMILY MEDICINE | Facility: CLINIC | Age: 45
End: 2025-01-08
Payer: COMMERCIAL

## 2025-01-13 ENCOUNTER — HOSPITAL ENCOUNTER (OUTPATIENT)
Dept: ULTRASOUND IMAGING | Facility: CLINIC | Age: 45
Discharge: HOME | End: 2025-01-13
Attending: NURSE PRACTITIONER
Payer: COMMERCIAL

## 2025-01-13 ENCOUNTER — HOSPITAL ENCOUNTER (OUTPATIENT)
Dept: MAMMOGRAPHY | Facility: CLINIC | Age: 45
Discharge: HOME | End: 2025-01-13
Attending: NURSE PRACTITIONER
Payer: COMMERCIAL

## 2025-01-13 DIAGNOSIS — Z12.31 VISIT FOR SCREENING MAMMOGRAM: ICD-10-CM

## 2025-01-13 DIAGNOSIS — E04.9 THYROID ENLARGEMENT: ICD-10-CM

## 2025-01-13 PROCEDURE — 77067 SCR MAMMO BI INCL CAD: CPT

## 2025-01-13 PROCEDURE — 77063 BREAST TOMOSYNTHESIS BI: CPT

## 2025-01-13 PROCEDURE — 76536 US EXAM OF HEAD AND NECK: CPT

## 2025-02-10 ASSESSMENT — ANXIETY QUESTIONNAIRES
2. NOT BEING ABLE TO STOP OR CONTROL WORRYING: SEVERAL DAYS
3. WORRYING TOO MUCH ABOUT DIFFERENT THINGS: SEVERAL DAYS
GAD7 TOTAL SCORE: 8
1. FEELING NERVOUS, ANXIOUS, OR ON EDGE: MORE THAN HALF THE DAYS
8. IF YOU CHECKED OFF ANY PROBLEMS, HOW DIFFICULT HAVE THESE MADE IT FOR YOU TO DO YOUR WORK, TAKE CARE OF THINGS AT HOME, OR GET ALONG WITH OTHER PEOPLE?: SOMEWHAT DIFFICULT
7. FEELING AFRAID AS IF SOMETHING AWFUL MIGHT HAPPEN: SEVERAL DAYS
4. TROUBLE RELAXING: SEVERAL DAYS
5. BEING SO RESTLESS THAT IT IS HARD TO SIT STILL: SEVERAL DAYS
6. BECOMING EASILY ANNOYED OR IRRITABLE: SEVERAL DAYS
GAD7 TOTAL SCORE: 8
IF YOU CHECKED OFF ANY PROBLEMS ON THIS QUESTIONNAIRE, HOW DIFFICULT HAVE THESE PROBLEMS MADE IT FOR YOU TO DO YOUR WORK, TAKE CARE OF THINGS AT HOME, OR GET ALONG WITH OTHER PEOPLE: SOMEWHAT DIFFICULT

## 2025-02-11 ENCOUNTER — OFFICE VISIT (OUTPATIENT)
Dept: FAMILY MEDICINE | Facility: CLINIC | Age: 45
End: 2025-02-11
Payer: COMMERCIAL

## 2025-02-11 VITALS
SYSTOLIC BLOOD PRESSURE: 132 MMHG | DIASTOLIC BLOOD PRESSURE: 86 MMHG | HEART RATE: 63 BPM | HEIGHT: 61 IN | OXYGEN SATURATION: 99 % | TEMPERATURE: 97.9 F | WEIGHT: 183 LBS | BODY MASS INDEX: 34.55 KG/M2

## 2025-02-11 DIAGNOSIS — F41.1 GAD (GENERALIZED ANXIETY DISORDER): Primary | Chronic | ICD-10-CM

## 2025-02-11 DIAGNOSIS — N92.0 MENORRHAGIA WITH REGULAR CYCLE: ICD-10-CM

## 2025-02-11 DIAGNOSIS — F51.04 PSYCHOPHYSIOLOGICAL INSOMNIA: ICD-10-CM

## 2025-02-11 PROBLEM — E66.01 MORBID OBESITY (H): Chronic | Status: RESOLVED | Noted: 2019-09-19 | Resolved: 2025-02-11

## 2025-02-11 PROCEDURE — G2211 COMPLEX E/M VISIT ADD ON: HCPCS | Performed by: NURSE PRACTITIONER

## 2025-02-11 PROCEDURE — 99214 OFFICE O/P EST MOD 30 MIN: CPT | Performed by: NURSE PRACTITIONER

## 2025-02-11 RX ORDER — FLUOXETINE 10 MG/1
10 CAPSULE ORAL DAILY
Qty: 90 CAPSULE | Refills: 1 | Status: SHIPPED | OUTPATIENT
Start: 2025-02-11

## 2025-02-11 RX ORDER — TRAZODONE HYDROCHLORIDE 50 MG/1
25-50 TABLET ORAL AT BEDTIME
Qty: 90 TABLET | Refills: 1 | Status: SHIPPED | OUTPATIENT
Start: 2025-02-11

## 2025-02-11 RX ORDER — ACETAMINOPHEN AND CODEINE PHOSPHATE 120; 12 MG/5ML; MG/5ML
0.35 SOLUTION ORAL DAILY
Qty: 84 TABLET | Refills: 3 | Status: SHIPPED | OUTPATIENT
Start: 2025-02-11

## 2025-02-11 ASSESSMENT — PATIENT HEALTH QUESTIONNAIRE - PHQ9: SUM OF ALL RESPONSES TO PHQ QUESTIONS 1-9: 2

## 2025-02-11 NOTE — PROGRESS NOTES
Assessment & Plan     SUSANNAH (generalized anxiety disorder)  Improved but not controlled.  Plan to increase the fluoxetine to 30 mg daily.  Risks and benefits of medication discussed.  Will also start Micronor with significantly worsened symptoms prior to menses.    - FLUoxetine (PROZAC) 20 MG capsule; Take 1 capsule (20 mg) by mouth daily. In addition to the 10 mg capsule for a total of 30 mg daily  - traZODone (DESYREL) 50 MG tablet; Take 0.5-1 tablets (25-50 mg) by mouth at bedtime.  - FLUoxetine (PROZAC) 10 MG capsule; Take 1 capsule (10 mg) by mouth daily. In addition to the 20 mg capsule for a total of 30 mg daily    Psychophysiological insomnia  Stable  - traZODone (DESYREL) 50 MG tablet; Take 0.5-1 tablets (25-50 mg) by mouth at bedtime.    Menorrhagia with regular cycle  Per above.  Does have significantly heavy cycles for 2 days, Micronor sent to the pharmacy for symptoms.  Risks and benefits of medication discussed and written information provided  - norethindrone (MICRONOR) 0.35 MG tablet; Take 1 tablet (0.35 mg) by mouth daily.    The longitudinal plan of care for the diagnosis(es)/condition(s) as documented were addressed during this visit. Due to the added complexity in care, I will continue to support Yolanda in the subsequent management and with ongoing continuity of care.      Carlos Guerrero is a 44 year old, presenting for the following health issues:  Recheck Medication        2/11/2025     4:03 PM   Additional Questions   Roomed by Morenita CHASE CMA     History of Present Illness       Mental Health Follow-up:  Patient presents to follow-up on Depression & Anxiety.Patient's depression since last visit has been:  Good  The patient is not having other symptoms associated with depression.  Patient's anxiety since last visit has been:  Better  The patient is not having other symptoms associated with anxiety.  Any significant life events: No  Patient is feeling anxious or having panic attacks.  Patient  "has no concerns about alcohol or drug use.    She eats 0-1 servings of fruits and vegetables daily.She consumes 1 sweetened beverage(s) daily.She exercises with enough effort to increase her heart rate 9 or less minutes per day.  She exercises with enough effort to increase her heart rate 3 or less days per week.   She is taking medications regularly.      Depression and Anxiety     Improved but worse the week prior to menses  2 days of very heavy periods (doesn't leave the those those days)- once went off birth control -had tubal for pregnancy prevention    Social History     Tobacco Use    Smoking status: Never    Smokeless tobacco: Never   Vaping Use    Vaping status: Never Used   Substance Use Topics    Alcohol use: Yes     Comment: Ocassionaly    Drug use: No         5/1/2023     7:31 AM 1/10/2024     6:53 AM 11/29/2024    11:04 AM   PHQ   PHQ-9 Total Score 0 2 5   Q9: Thoughts of better off dead/self-harm past 2 weeks Not at all  Not at all Not at all       Proxy-reported         5/1/2023     7:40 AM 11/29/2024    11:04 AM 2/10/2025     6:16 PM   SUSANNAH-7 SCORE   Total Score 0 (minimal anxiety)  8 (mild anxiety)   Total Score 0 18 8        Patient-reported         Review of Systems  Constitutional, HEENT, cardiovascular, pulmonary, gi and gu systems are negative, except as otherwise noted.      Objective    /86 (BP Location: Right arm, Patient Position: Sitting, Cuff Size: Adult Regular)   Pulse 63   Temp 97.9  F (36.6  C) (Tympanic)   Ht 1.556 m (5' 1.25\")   Wt 83 kg (183 lb)   SpO2 99%   BMI 34.30 kg/m    Body mass index is 34.3 kg/m .  Physical Exam   GENERAL: alert and no distress  PSYCH: mentation appears normal, affect normal/bright          Signed Electronically by: TANNA Gordon CNP    "

## 2025-05-01 ASSESSMENT — ANXIETY QUESTIONNAIRES
8. IF YOU CHECKED OFF ANY PROBLEMS, HOW DIFFICULT HAVE THESE MADE IT FOR YOU TO DO YOUR WORK, TAKE CARE OF THINGS AT HOME, OR GET ALONG WITH OTHER PEOPLE?: SOMEWHAT DIFFICULT
7. FEELING AFRAID AS IF SOMETHING AWFUL MIGHT HAPPEN: NOT AT ALL
7. FEELING AFRAID AS IF SOMETHING AWFUL MIGHT HAPPEN: NOT AT ALL
1. FEELING NERVOUS, ANXIOUS, OR ON EDGE: SEVERAL DAYS
5. BEING SO RESTLESS THAT IT IS HARD TO SIT STILL: NOT AT ALL
GAD7 TOTAL SCORE: 3
3. WORRYING TOO MUCH ABOUT DIFFERENT THINGS: SEVERAL DAYS
GAD7 TOTAL SCORE: 3
IF YOU CHECKED OFF ANY PROBLEMS ON THIS QUESTIONNAIRE, HOW DIFFICULT HAVE THESE PROBLEMS MADE IT FOR YOU TO DO YOUR WORK, TAKE CARE OF THINGS AT HOME, OR GET ALONG WITH OTHER PEOPLE: SOMEWHAT DIFFICULT
6. BECOMING EASILY ANNOYED OR IRRITABLE: SEVERAL DAYS
GAD7 TOTAL SCORE: 3
4. TROUBLE RELAXING: NOT AT ALL
2. NOT BEING ABLE TO STOP OR CONTROL WORRYING: NOT AT ALL

## 2025-05-06 ENCOUNTER — OFFICE VISIT (OUTPATIENT)
Dept: FAMILY MEDICINE | Facility: CLINIC | Age: 45
End: 2025-05-06
Payer: COMMERCIAL

## 2025-05-06 VITALS
RESPIRATION RATE: 18 BRPM | DIASTOLIC BLOOD PRESSURE: 72 MMHG | HEART RATE: 70 BPM | BODY MASS INDEX: 35.3 KG/M2 | HEIGHT: 61 IN | TEMPERATURE: 99 F | OXYGEN SATURATION: 97 % | SYSTOLIC BLOOD PRESSURE: 126 MMHG | WEIGHT: 187 LBS

## 2025-05-06 DIAGNOSIS — F41.1 GAD (GENERALIZED ANXIETY DISORDER): Primary | Chronic | ICD-10-CM

## 2025-05-06 DIAGNOSIS — L82.0 INFLAMED SEBORRHEIC KERATOSIS: ICD-10-CM

## 2025-05-06 DIAGNOSIS — E66.812 CLASS 2 SEVERE OBESITY WITH BODY MASS INDEX (BMI) OF 35 TO 39.9 WITH SERIOUS COMORBIDITY (H): ICD-10-CM

## 2025-05-06 DIAGNOSIS — I10 BENIGN ESSENTIAL HYPERTENSION: ICD-10-CM

## 2025-05-06 DIAGNOSIS — E66.01 CLASS 2 SEVERE OBESITY WITH BODY MASS INDEX (BMI) OF 35 TO 39.9 WITH SERIOUS COMORBIDITY (H): ICD-10-CM

## 2025-05-06 PROCEDURE — 99214 OFFICE O/P EST MOD 30 MIN: CPT | Performed by: NURSE PRACTITIONER

## 2025-05-06 RX ORDER — FLUOXETINE 10 MG/1
10 CAPSULE ORAL DAILY
Qty: 90 CAPSULE | Refills: 1 | Status: SHIPPED | OUTPATIENT
Start: 2025-05-06

## 2025-05-06 RX ORDER — HYDROCHLOROTHIAZIDE 25 MG/1
25 TABLET ORAL EVERY MORNING
Qty: 90 TABLET | Refills: 3 | Status: SHIPPED | OUTPATIENT
Start: 2025-05-06

## 2025-05-06 RX ORDER — LISINOPRIL 5 MG/1
5 TABLET ORAL DAILY
Qty: 90 TABLET | Refills: 3 | Status: SHIPPED | OUTPATIENT
Start: 2025-05-06

## 2025-05-06 ASSESSMENT — PATIENT HEALTH QUESTIONNAIRE - PHQ9
SUM OF ALL RESPONSES TO PHQ QUESTIONS 1-9: 4
10. IF YOU CHECKED OFF ANY PROBLEMS, HOW DIFFICULT HAVE THESE PROBLEMS MADE IT FOR YOU TO DO YOUR WORK, TAKE CARE OF THINGS AT HOME, OR GET ALONG WITH OTHER PEOPLE: SOMEWHAT DIFFICULT
SUM OF ALL RESPONSES TO PHQ QUESTIONS 1-9: 4

## 2025-05-06 ASSESSMENT — PAIN SCALES - GENERAL: PAINLEVEL_OUTOF10: NO PAIN (0)

## 2025-05-06 NOTE — PROGRESS NOTES
Assessment & Plan     SUSANNAH (generalized anxiety disorder)  Stable with current medication.  No changes today.  Will follow-up with the video or in person visit if needing paperwork completed for work to continue teleworking.  - FLUoxetine (PROZAC) 20 MG capsule; Take 1 capsule (20 mg) by mouth daily. In addition to the 10 mg capsule for a total of 30 mg daily  - FLUoxetine (PROZAC) 10 MG capsule; Take 1 capsule (10 mg) by mouth daily. In addition to the 20 mg capsule for a total of 30 mg daily    Benign essential hypertension  Stable with current medication.  Monitor your blood pressure at home and notify the clinic if consistently greater than 130/80 -two hours after your medication   - hydrochlorothiazide (HYDRODIURIL) 25 MG tablet; Take 1 tablet (25 mg) by mouth every morning.  - lisinopril (ZESTRIL) 5 MG tablet; Take 1 tablet (5 mg) by mouth daily.    Inflamed seborrheic keratosis  Benign appearing lesion changes on back    Class 2 severe obesity with body mass index (BMI) of 35 to 39.9 with serious comorbidity (H)  BMI elevated at 35 with comorbid hypertension.  Lifestyle recommendations discussed to bring BMI down and improve comorbid conditions.        Carlos Guerrero is a 44 year old, presenting for the following health issues:  Mole (Would like moles on back checked. ) and Anxiety (Anxiety med recheck. Discuss accomodation for teleworking- feeling anxiety having to drive to [place]. )        5/6/2025     4:27 PM   Additional Questions   Roomed by Cristiane GREEN   Accompanied by self         5/6/2025     4:27 PM   Patient Reported Additional Medications   Patient reports taking the following new medications no new meds     History of Present Illness       Mental Health Follow-up:  Patient presents to follow-up on Anxiety.    Patient's anxiety since last visit has been:  Better  The patient is having other symptoms associated with anxiety.  Any significant life events: job concerns  Patient is feeling anxious  or having panic attacks.  Patient has no concerns about alcohol or drug use.    She eats 0-1 servings of fruits and vegetables daily.She consumes 0 sweetened beverage(s) daily.She exercises with enough effort to increase her heart rate 9 or less minutes per day.  She exercises with enough effort to increase her heart rate 3 or less days per week.   She is taking medications regularly.      Returning to work is causing increased anxiety, driving 50+ miles to work  Having trouble focusing and worrying about returning to work  Took this position due to telework     Social History     Tobacco Use    Smoking status: Never    Smokeless tobacco: Never   Vaping Use    Vaping status: Never Used   Substance Use Topics    Alcohol use: Yes     Comment: Ocassionaly    Drug use: No         11/29/2024    11:04 AM 2/11/2025     4:00 PM 5/6/2025     4:25 PM   PHQ   PHQ-9 Total Score 5 2 4    Q9: Thoughts of better off dead/self-harm past 2 weeks Not at all Not at all Not at all       Patient-reported         11/29/2024    11:04 AM 2/10/2025     6:16 PM 5/1/2025     8:46 AM   SUSANNAH-7 SCORE   Total Score  8 (mild anxiety) 3 (minimal anxiety)   Total Score 18 8  3        Patient-reported         5/6/2025     4:25 PM   Last PHQ-9   1.  Little interest or pleasure in doing things 0   2.  Feeling down, depressed, or hopeless 1   3.  Trouble falling or staying asleep, or sleeping too much 1   4.  Feeling tired or having little energy 0   5.  Poor appetite or overeating 1   6.  Feeling bad about yourself 0   7.  Trouble concentrating 1   8.  Moving slowly or restless 0   Q9: Thoughts of better off dead/self-harm past 2 weeks 0   PHQ-9 Total Score 4        Patient-reported         5/1/2025     8:46 AM   SUSANNAH-7    1. Feeling nervous, anxious, or on edge 1   2. Not being able to stop or control worrying 0   3. Worrying too much about different things 1   4. Trouble relaxing 0   5. Being so restless that it is hard to sit still 0   6. Becoming  "easily annoyed or irritable 1   7. Feeling afraid, as if something awful might happen 0   SUSANNAH-7 Total Score 3    If you checked any problems, how difficult have they made it for you to do your work, take care of things at home, or get along with other people? Somewhat difficult       Patient-reported     Hypertension Follow-up    Do you check your blood pressure regularly outside of the clinic? No   Are you following a low salt diet? Yes, salt aware   Are your blood pressures ever more than 140 on the top number (systolic) OR more   than 90 on the bottom number (diastolic), for example 140/90? N/A    BP Readings from Last 2 Encounters:   05/06/25 126/72   02/11/25 132/86         Review of Systems  Constitutional, HEENT, cardiovascular, pulmonary, gi and gu systems are negative, except as otherwise noted.      Objective    /72   Pulse 70   Temp 99  F (37.2  C) (Tympanic)   Resp 18   Ht 1.556 m (5' 1.25\")   Wt 84.8 kg (187 lb)   SpO2 97%   BMI 35.05 kg/m    Body mass index is 35.05 kg/m .  Physical Exam   GENERAL: alert and no distress  NECK: no adenopathy, no asymmetry, masses, or scars  RESP: lungs clear to auscultation - no rales, rhonchi or wheezes  CV: regular rate and rhythm, normal S1 S2, no S3 or S4, no murmur, click or rub, no peripheral edema  SKIN: no suspicious lesions or rashes  PSYCH: mentation appears normal, affect normal/bright    No results found for any visits on 05/06/25.        Signed Electronically by: TANNA Gordon CNP    "

## 2025-05-06 NOTE — PATIENT INSTRUCTIONS
Monitor your blood pressure at home and notify the clinic if consistently greater than 130/80 -two hours after your medication     Follow up with a video/in person visit if your paperwork is more than a letter

## 2025-05-15 PROBLEM — E66.01 CLASS 2 SEVERE OBESITY WITH BODY MASS INDEX (BMI) OF 35 TO 39.9 WITH SERIOUS COMORBIDITY (H): Status: ACTIVE | Noted: 2025-05-15

## 2025-05-15 PROBLEM — E66.812 CLASS 2 SEVERE OBESITY WITH BODY MASS INDEX (BMI) OF 35 TO 39.9 WITH SERIOUS COMORBIDITY (H): Status: ACTIVE | Noted: 2025-05-15

## 2025-07-22 ASSESSMENT — ANXIETY QUESTIONNAIRES
IF YOU CHECKED OFF ANY PROBLEMS ON THIS QUESTIONNAIRE, HOW DIFFICULT HAVE THESE PROBLEMS MADE IT FOR YOU TO DO YOUR WORK, TAKE CARE OF THINGS AT HOME, OR GET ALONG WITH OTHER PEOPLE: VERY DIFFICULT
6. BECOMING EASILY ANNOYED OR IRRITABLE: MORE THAN HALF THE DAYS
GAD7 TOTAL SCORE: 8
2. NOT BEING ABLE TO STOP OR CONTROL WORRYING: SEVERAL DAYS
1. FEELING NERVOUS, ANXIOUS, OR ON EDGE: MORE THAN HALF THE DAYS
5. BEING SO RESTLESS THAT IT IS HARD TO SIT STILL: NOT AT ALL
4. TROUBLE RELAXING: SEVERAL DAYS
7. FEELING AFRAID AS IF SOMETHING AWFUL MIGHT HAPPEN: SEVERAL DAYS
3. WORRYING TOO MUCH ABOUT DIFFERENT THINGS: SEVERAL DAYS

## 2025-07-23 ENCOUNTER — VIRTUAL VISIT (OUTPATIENT)
Dept: FAMILY MEDICINE | Facility: CLINIC | Age: 45
End: 2025-07-23
Payer: COMMERCIAL

## 2025-07-23 DIAGNOSIS — F41.1 GAD (GENERALIZED ANXIETY DISORDER): Primary | Chronic | ICD-10-CM

## 2025-07-23 DIAGNOSIS — F51.04 PSYCHOPHYSIOLOGICAL INSOMNIA: ICD-10-CM

## 2025-07-23 PROCEDURE — 98006 SYNCH AUDIO-VIDEO EST MOD 30: CPT | Performed by: NURSE PRACTITIONER

## 2025-07-23 RX ORDER — PROPRANOLOL HYDROCHLORIDE 10 MG/1
10 TABLET ORAL 2 TIMES DAILY PRN
Qty: 30 TABLET | Refills: 1 | Status: SHIPPED | OUTPATIENT
Start: 2025-07-23

## 2025-07-23 RX ORDER — TRAZODONE HYDROCHLORIDE 50 MG/1
25-50 TABLET ORAL AT BEDTIME
Qty: 90 TABLET | Refills: 1 | Status: SHIPPED | OUTPATIENT
Start: 2025-07-23

## 2025-07-23 RX ORDER — FLUOXETINE 10 MG/1
10 CAPSULE ORAL DAILY
Qty: 90 CAPSULE | Refills: 1 | Status: CANCELLED | OUTPATIENT
Start: 2025-07-23

## 2025-07-23 RX ORDER — FLUOXETINE HYDROCHLORIDE 40 MG/1
40 CAPSULE ORAL DAILY
Qty: 90 CAPSULE | Refills: 1 | Status: SHIPPED | OUTPATIENT
Start: 2025-07-23

## 2025-07-23 ASSESSMENT — ANXIETY QUESTIONNAIRES
1. FEELING NERVOUS, ANXIOUS, OR ON EDGE: MORE THAN HALF THE DAYS
2. NOT BEING ABLE TO STOP OR CONTROL WORRYING: SEVERAL DAYS
8. IF YOU CHECKED OFF ANY PROBLEMS, HOW DIFFICULT HAVE THESE MADE IT FOR YOU TO DO YOUR WORK, TAKE CARE OF THINGS AT HOME, OR GET ALONG WITH OTHER PEOPLE?: VERY DIFFICULT
7. FEELING AFRAID AS IF SOMETHING AWFUL MIGHT HAPPEN: SEVERAL DAYS
6. BECOMING EASILY ANNOYED OR IRRITABLE: MORE THAN HALF THE DAYS
7. FEELING AFRAID AS IF SOMETHING AWFUL MIGHT HAPPEN: SEVERAL DAYS
IF YOU CHECKED OFF ANY PROBLEMS ON THIS QUESTIONNAIRE, HOW DIFFICULT HAVE THESE PROBLEMS MADE IT FOR YOU TO DO YOUR WORK, TAKE CARE OF THINGS AT HOME, OR GET ALONG WITH OTHER PEOPLE: VERY DIFFICULT
GAD7 TOTAL SCORE: 8
5. BEING SO RESTLESS THAT IT IS HARD TO SIT STILL: NOT AT ALL
3. WORRYING TOO MUCH ABOUT DIFFERENT THINGS: SEVERAL DAYS
GAD7 TOTAL SCORE: 8
GAD7 TOTAL SCORE: 8
4. TROUBLE RELAXING: SEVERAL DAYS

## 2025-07-23 ASSESSMENT — ENCOUNTER SYMPTOMS: NERVOUS/ANXIOUS: 1

## 2025-07-23 ASSESSMENT — PATIENT HEALTH QUESTIONNAIRE - PHQ9: SUM OF ALL RESPONSES TO PHQ QUESTIONS 1-9: 10

## 2025-07-23 NOTE — PROGRESS NOTES
Yolanda is a 44 year old who is being evaluated via a billable video visit.    How would you like to obtain your AVS? [email or web address]  If the video visit is dropped, the invitation should be resent by: Text to cell phone: [phone], joining through [email or web address]   Will anyone else be joining your video visit? No      {PROVIDER CHARTING PREFERENCE:[id number]}    Subjective   Yolanda is a 44 year old, presenting for the following health issues:  Anxiety        7/23/2025    11:59 AM   Additional Questions   Roomed by Cristiane GREEN   Accompanied by self         7/23/2025    11:59 AM   Patient Reported Additional Medications   Patient reports taking the following new medications no new meds     Anxiety    History of Present Illness       Mental Health Follow-up:  Patient presents to follow-up on Anxiety.    Patient's anxiety since last visit has been:  Worse  The patient is having other symptoms associated with anxiety.  Any significant life events: No  Patient is feeling anxious or having panic attacks.  Patient has no concerns about alcohol or drug use.    She eats 0-1 servings of fruits and vegetables daily.She consumes 0 sweetened beverage(s) daily.She exercises with enough effort to increase her heart rate 9 or less minutes per day.  She exercises with enough effort to increase her heart rate 3 or less days per week.   She is taking medications regularly.      Going on for about a month, no known triggers   Has missed one day of the last 3 weeks due to physical symptoms   Tight chest, abdominal discomfort, diarrhea     Review of Systems  Constitutional, HEENT, cardiovascular, pulmonary, gi and gu systems are negative, except as otherwise noted.      Objective         Vitals:  No vitals were obtained today due to virtual visit.    Physical Exam   GENERAL: alert and no distress  EYES: Eyes grossly normal to inspection.  No discharge or erythema, or obvious scleral/conjunctival abnormalities.  RESP: No audible wheeze, cough, or visible  cyanosis.    SKIN: Visible skin clear. No significant rash, abnormal pigmentation or lesions.  NEURO: Cranial nerves grossly intact.  Mentation and speech appropriate for age.  PSYCH: Appropriate affect, tone, and pace of words        Video-Visit Details    Type of service:  Video Visit   Originating Location (pt. Location): Home  Distant Location (provider location):  On-site  Platform used for Video Visit: Payal  Signed Electronically by: TANNA Gordon CNP

## 2025-07-24 ENCOUNTER — PATIENT OUTREACH (OUTPATIENT)
Dept: CARE COORDINATION | Facility: CLINIC | Age: 45
End: 2025-07-24
Payer: COMMERCIAL

## 2025-07-28 ENCOUNTER — PATIENT OUTREACH (OUTPATIENT)
Dept: CARE COORDINATION | Facility: CLINIC | Age: 45
End: 2025-07-28
Payer: COMMERCIAL

## 2025-07-30 ENCOUNTER — VIRTUAL VISIT (OUTPATIENT)
Facility: CLINIC | Age: 45
End: 2025-07-30
Attending: NURSE PRACTITIONER
Payer: COMMERCIAL

## 2025-07-30 DIAGNOSIS — F51.04 PSYCHOPHYSIOLOGICAL INSOMNIA: ICD-10-CM

## 2025-07-30 DIAGNOSIS — F41.1 GAD (GENERALIZED ANXIETY DISORDER): Chronic | ICD-10-CM

## 2025-07-30 PROCEDURE — 90832 PSYTX W PT 30 MINUTES: CPT | Mod: 95

## 2025-07-30 ASSESSMENT — PATIENT HEALTH QUESTIONNAIRE - PHQ9
SUM OF ALL RESPONSES TO PHQ QUESTIONS 1-9: 10
SUM OF ALL RESPONSES TO PHQ QUESTIONS 1-9: 10
10. IF YOU CHECKED OFF ANY PROBLEMS, HOW DIFFICULT HAVE THESE PROBLEMS MADE IT FOR YOU TO DO YOUR WORK, TAKE CARE OF THINGS AT HOME, OR GET ALONG WITH OTHER PEOPLE: SOMEWHAT DIFFICULT

## 2025-07-30 ASSESSMENT — COLUMBIA-SUICIDE SEVERITY RATING SCALE - C-SSRS
2. HAVE YOU ACTUALLY HAD ANY THOUGHTS OF KILLING YOURSELF?: NO
SUICIDE, SINCE LAST CONTACT: NO
1. SINCE LAST CONTACT, HAVE YOU WISHED YOU WERE DEAD OR WISHED YOU COULD GO TO SLEEP AND NOT WAKE UP?: YES
TOTAL  NUMBER OF ABORTED OR SELF INTERRUPTED ATTEMPTS SINCE LAST CONTACT: NO
REASONS FOR IDEATION SINCE LAST CONTACT: MOSTLY TO END OR STOP THE PAIN (YOU COULDN'T GO ON LIVING WITH THE PAIN OR HOW YOU WERE FEELING)
ATTEMPT SINCE LAST CONTACT: NO
6. HAVE YOU EVER DONE ANYTHING, STARTED TO DO ANYTHING, OR PREPARED TO DO ANYTHING TO END YOUR LIFE?: NO
TOTAL  NUMBER OF INTERRUPTED ATTEMPTS SINCE LAST CONTACT: NO

## 2025-07-30 NOTE — PROGRESS NOTES
[facility]: Integrated Behavioral Health    Integrated Behavioral Health   Mental Health & Addiction Services      Progress Note - Initial [facility] Visit     Patient Name: Yolanda Barnes    Date: 2025  Service Type: Individual   Visit Start Time: 2:00 pm  Visit End Time:  2:27 pm   Attendees: Patient   Service Modality: Video Visit:      Provider verified identity through the following two step process.  Patient provided:  Patient     Telemedicine Visit: The patient's condition can be safely assessed and treated via synchronous audio and visual telemedicine encounter.      Reason for Telemedicine Visit: Patient has requested telehealth visit    Originating Site (Patient Location): Patient's home    Distant Site (Provider Location): Provider Remote Setting- Home Office    Consent:  The patient/guardian has verbally consented to: the potential risks and benefits of telemedicine (video visit) versus in person care; bill my insurance or make self-payment for services provided; and responsibility for payment of non-covered services.     Patient would like the video invitation sent by:  My Chart    Mode of Communication:  Video Conference via Amwell    Distant Location (Provider):  Off-site    As the provider I attest to compliance with applicable laws and regulations related to telemedicine.     [facility] Visit Activities (Refresh list every visit): NEW and [facility] Only         DATA:     Interactive Complexity: No   Crisis: No     Assessments completed:     The following assessments were completed by patient for this visit:  PHQ2:   Phq2 (    [employer],All Rights Reserved. Used With Permission. Developed By Svitlana Perez,Yesica Cosby,Soto Negrete And Colleagues,With An Educational Feliz From [employer].)    2/10/2025  6:13 PM CST - Filed by Patient   The following questionnaire should only be answered by the patient. Are you the patient? Yes   Over the last 2  weeks, how often have you been bothered by any of the following problems?    Q1: Little interest or pleasure in doing things Not at all   Q2: Feeling down, depressed or hopeless Not at all   PHQ2 (   1999 [employer],ALL RIGHTS RESERVED. USED WITH PERMISSION. DEVELOPED BY BRETT BROOKS,LIVIA NUNES,GUSTAVO MARROQUIN AND COLLEAGUES,WITH AN EDUCATIONAL LESLI FROM "[employer] INC.)    PHQ-2 Score (range: 0 - 6) 0       PHQ9:       5/1/2023     7:31 AM 1/10/2024     6:53 AM 11/29/2024    11:04 AM 2/11/2025     4:00 PM 5/6/2025     4:25 PM 7/23/2025    12:03 PM 7/30/2025     1:44 PM   PHQ-9 SCORE   PHQ-9 Total Score MyChart 0 2 (Minimal depression)   4 (Minimal depression)  10 (Moderate depression)   PHQ-9 Total Score 0 2 5 2 4  10 10        Patient-reported     GAD2:        No data to display              GAD7:       4/28/2023    10:33 AM 5/1/2023     7:40 AM 11/29/2024    11:04 AM 2/10/2025     6:16 PM 5/1/2025     8:46 AM 7/22/2025     7:20 AM 7/23/2025     6:48 AM   SUSANNAH-7 SCORE   Total Score  0 (minimal anxiety)  8 (mild anxiety) 3 (minimal anxiety) 8 (mild anxiety) 8 (mild anxiety)   Total Score 0 0 18 8  3  8  8        Patient-reported     PROMIS 10-Global Health (all questions and answers displayed):       7/28/2025    12:31 PM   PROMIS 10   In general, would you say your health is: Fair   In general, would you say your quality of life is: Good   In general, how would you rate your physical health? Fair   In general, how would you rate your mental health, including your mood and your ability to think? Fair   In general, how would you rate your satisfaction with your social activities and relationships? Good   In general, please rate how well you carry out your usual social activities and roles Fair   To what extent are you able to carry out your everyday physical activities such as walking, climbing stairs, carrying groceries, or moving a chair? Moderately   In the past 7 days, how often have you been  bothered by emotional problems such as feeling anxious, depressed, or irritable? Often   In the past 7 days, how would you rate your fatigue on average? Mild   In the past 7 days, how would you rate your pain on average, where 0 means no pain, and 10 means worst imaginable pain? 0   In general, would you say your health is: 2   In general, would you say your quality of life is: 3   In general, how would you rate your physical health? 2   In general, how would you rate your mental health, including your mood and your ability to think? 2   In general, how would you rate your satisfaction with your social activities and relationships? 3   In general, please rate how well you carry out your usual social activities and roles. (This includes activities at home, at work and in your community, and responsibilities as a parent, child, spouse, employee, friend, etc.) 2   To what extent are you able to carry out your everyday physical activities such as walking, climbing stairs, carrying groceries, or moving a chair? 3   In the past 7 days, how often have you been bothered by emotional problems such as feeling anxious, depressed, or irritable? 4   In the past 7 days, how would you rate your fatigue on average? 2   In the past 7 days, how would you rate your pain on average, where 0 means no pain, and 10 means worst imaginable pain? 0   Global Mental Health Score 10    Global Physical Health Score 14    PROMIS TOTAL - SUBSCORES 24        Patient-reported     PROMIS 10-Global Health (only subscores and total score):       7/28/2025    12:31 PM   PROMIS-10 Scores Only   Global Mental Health Score 10    Global Physical Health Score 14    PROMIS TOTAL - SUBSCORES 24        Patient-reported     [place] Suicide Severity Rating Scale (Lifetime/Recent)       No data to display              [place] Suicide Severity Rating Scale (Short Version)      7/30/2025     3:36 PM   [place] Suicide Severity Rating (Short Version)   1. Wish to  "be Dead (Since Last Contact) Y   Wish to be Dead Description (Since Last Contact) pt describes passive SI thoughts with no plan or intent to harm \"If I wasnt here, I wouldn't have to deal with this\"   2. Non-Specific Active Suicidal Thoughts (Since Last Contact) N   Most Severe Ideation Rating (Since Last Contact) 2   Frequency (Since Last Contact) 3   Duration (Since Last Contact) 2   Deterrents (Since Last Contact) 1   Reasons for Ideation (Since Last Contact) 4   Actual Attempt (Since Last Contact) N   Has subject engaged in non-suicidal self-injurious behavior? (Since Last Contact) N   Interrupted Attempts (Since Last Contact) N   Aborted or Self-Interrupted Attempt (Since Last Contact) N   Preparatory Acts or Behavior (Since Last Contact) N   Suicide (Since Last Contact) N   Calculated C-SSRS Risk Score (Since Last Contact) Low Risk        Referral:   Patient was referred to [facility] by primary care provider.    Reason for referral: clarify behavioral health diagnosis and determine behavioral health treatment options.      [facility] introduced self and role. Discussed informed consent and limits to confidentiality.     Presenting Concerns/ Current Stressors:   Pt presents to address life long anxiety issues which have recently returned. Pt states she's been on an off therapy and meds all her life. She states she's back on meds(improving) but still 1-3 days per week \"I don't want to go work. Feeling sick to stomach.\" And is seeking new coping and finding out why.      Pt states she lives with  of 15 years and two kids 12 and 10, not a lot of friends outside of family. Pt states she has tendencies to be co dependent. \" I feel like he's getting sick of my crap\"    Pt reports she does not about hurting self, think 3 times a week \"I think If I wasn't here I wouldn't have to deal with this.\" \"There is no part of me that would take action.\" Pt states she was hospitalized in 9th grade for depression and did poorly " "socially, good academically in school.  Pt states both deep breathing and positive self talk- work to manage symptoms 80-90 % of the time.     Therapeutic Interventions:  Motivational Interviewing (MI): Validated patient's thoughts, feelings and experience. Expressed respect for patient's autonomy in decision making.  Asked open-ended questions to invite patient's self-reflection and self-direction around change and what is important for them in working towards their goals.  Expressed and demonstrated empathy through reflective listening.  Affirmed patient's strengths and abilities.  Psycho-education: Provided psycho-education about patient's behavioral health condition and symptoms. Explained and reviewed treatment options.    Response to treatment interventions:   Patient was engaged in the therapy process.      Safety Issues and Plan for Safety and Risk Management:     Patient has had a history of suicidal ideation: pt describes passive SI thoughts with no plan or intent to harm \"if I wasn't here, I wouldn't have to deal with this\".   Patient denies current fears or concerns for personal safety.   Patient reports the following current or recent suicidal ideation or behaviors: has had a history of suicidal ideation: pt describes passive SI thoughts with no plan or intent to harm \"if I wasn't here, I wouldn't have to deal with this\". .   Patient denies current or recent homicidal ideation or behaviors.   Patient denies current or recent self injurious behavior or ideation.   Patient denies other safety concerns.   Recommended that patient call 911 or go to the local ED should there be a change in any of these risk factors   Patient reports there are no firearms in the house.       ASSESSMENT:   Mental Status:     Appearance:   Appropriate    Eye Contact:   Good    Psychomotor Behavior: Normal    Attitude:   Cooperative    Orientation:   All   Speech Rate / Production: Normal/ Responsive   Volume:   Normal  "   Mood:    Normal   Affect:    Appropriate    Thought Content:  Clear    Thought Form:  Coherent  Goal Directed  Logical    Insight:    Good         Diagnostic Criteria:   Generalized Anxiety Disorder  A. Excessive anxiety and worry about a number of events or activities (such as work or school performance).   B. The person finds it difficult to control the worry.  C. Select 3 or more symptoms (required for diagnosis). Only one item is required in children.   - Restlessness or feeling keyed up or on edge.    - Difficulty concentrating or mind going blank.    - Irritability.   D. The focus of the anxiety and worry is not confined to features of an Axis I disorder.  E. The anxiety, worry, or physical symptoms cause clinically significant distress or impairment in social, occupational, or other important areas of functioning.   F. The disturbance is not due to the direct physiological effects of a substance (e.g., a drug of abuse, a medication) or a general medical condition (e.g., hyperthyroidism) and does not occur exclusively during a Mood Disorder, a Psychotic Disorder, or a Pervasive Developmental Disorder.        DSM5 Diagnoses: (Sustained by DSM5 Criteria Listed Above)     Diagnoses: 300.02 (F41.1) Generalized Anxiety Disorder     Psychosocial / Contextual Factors: Occupational Issues       Collateral Reports Completed:   Routed note to PCP        PLAN: (Homework, other):     1. Patient was provided:  recommendation to schedule follow-up with [facility] [place]     2. Provider recommended the following referrals: Pt to have follow up visit with [facility] in two weeks.        3. Suicide Risk and Safety Concerns were assessed for Yolanda Barnes    Safety Plan:   Patient denied any current/recent/lifetime history of suicidal ideation and/or behaviors. Recommended that patient call 911 or go to the local ED should there be a change in any of these risk factors       RUI Gordon, [facility] [place]   July 30, 2025    Answers submitted by the  patient for this visit:  Patient Health Questionnaire (Submitted on 7/30/2025)  If you checked off any problems, how difficult have these problems made it for you to do your work, take care of things at home, or get along with other people?: Somewhat difficult  PHQ9 TOTAL SCORE: 10

## 2025-08-14 ENCOUNTER — VIRTUAL VISIT (OUTPATIENT)
Dept: BEHAVIORAL HEALTH | Facility: CLINIC | Age: 45
End: 2025-08-14
Payer: COMMERCIAL

## 2025-08-14 DIAGNOSIS — F41.1 GAD (GENERALIZED ANXIETY DISORDER): Primary | ICD-10-CM
